# Patient Record
Sex: MALE | NOT HISPANIC OR LATINO | Employment: UNEMPLOYED | URBAN - METROPOLITAN AREA
[De-identification: names, ages, dates, MRNs, and addresses within clinical notes are randomized per-mention and may not be internally consistent; named-entity substitution may affect disease eponyms.]

---

## 2022-11-21 ENCOUNTER — APPOINTMENT (OUTPATIENT)
Dept: GENERAL RADIOLOGY | Facility: CLINIC | Age: 27
DRG: 194 | End: 2022-11-21
Attending: NURSE PRACTITIONER
Payer: COMMERCIAL

## 2022-11-21 ENCOUNTER — HOSPITAL ENCOUNTER (INPATIENT)
Facility: CLINIC | Age: 27
LOS: 1 days | Discharge: LEFT BEFORE DISCHARGE | DRG: 194 | End: 2022-11-22
Attending: EMERGENCY MEDICINE | Admitting: STUDENT IN AN ORGANIZED HEALTH CARE EDUCATION/TRAINING PROGRAM
Payer: COMMERCIAL

## 2022-11-21 DIAGNOSIS — R11.2 NAUSEA AND VOMITING, UNSPECIFIED VOMITING TYPE: ICD-10-CM

## 2022-11-21 DIAGNOSIS — Z11.52 ENCOUNTER FOR SCREENING LABORATORY TESTING FOR SEVERE ACUTE RESPIRATORY SYNDROME CORONAVIRUS 2 (SARS-COV-2): ICD-10-CM

## 2022-11-21 DIAGNOSIS — E83.39 HYPOPHOSPHATEMIA: ICD-10-CM

## 2022-11-21 DIAGNOSIS — E87.6 HYPOKALEMIA: ICD-10-CM

## 2022-11-21 DIAGNOSIS — E87.3 ALKALOSIS: ICD-10-CM

## 2022-11-21 DIAGNOSIS — J10.1 INFLUENZA A: ICD-10-CM

## 2022-11-21 LAB
ALBUMIN SERPL BCG-MCNC: 4.1 G/DL (ref 3.5–5.2)
ALBUMIN UR-MCNC: 20 MG/DL
ALP SERPL-CCNC: 71 U/L (ref 40–129)
ALT SERPL W P-5'-P-CCNC: 21 U/L (ref 10–50)
AMPHETAMINES UR QL SCN: ABNORMAL
ANION GAP SERPL CALCULATED.3IONS-SCNC: 17 MMOL/L (ref 7–15)
APPEARANCE UR: CLEAR
AST SERPL W P-5'-P-CCNC: 19 U/L (ref 10–50)
ATRIAL RATE - MUSE: 105 BPM
BARBITURATES UR QL SCN: ABNORMAL
BASE EXCESS BLDV CALC-SCNC: 19.3 MMOL/L (ref -7.7–1.9)
BASOPHILS # BLD AUTO: 0 10E3/UL (ref 0–0.2)
BASOPHILS NFR BLD AUTO: 0 %
BENZODIAZ UR QL SCN: ABNORMAL
BILIRUB SERPL-MCNC: 1.3 MG/DL
BILIRUB UR QL STRIP: NEGATIVE
BUN SERPL-MCNC: 15.1 MG/DL (ref 6–20)
BZE UR QL SCN: ABNORMAL
CA-I BLD-MCNC: 3.8 MG/DL (ref 4.4–5.2)
CALCIUM SERPL-MCNC: 8.9 MG/DL (ref 8.6–10)
CANNABINOIDS UR QL SCN: ABNORMAL
CHLORIDE SERPL-SCNC: 76 MMOL/L (ref 98–107)
COLOR UR AUTO: YELLOW
CPB POCT: NO
CREAT SERPL-MCNC: 0.98 MG/DL (ref 0.67–1.17)
CRP SERPL-MCNC: <3 MG/L
DEPRECATED HCO3 PLAS-SCNC: 32 MMOL/L (ref 22–29)
DIASTOLIC BLOOD PRESSURE - MUSE: NORMAL MMHG
EOSINOPHIL # BLD AUTO: 0 10E3/UL (ref 0–0.7)
EOSINOPHIL NFR BLD AUTO: 0 %
ERYTHROCYTE [DISTWIDTH] IN BLOOD BY AUTOMATED COUNT: 11 % (ref 10–15)
FLUAV RNA SPEC QL NAA+PROBE: POSITIVE
FLUBV RNA RESP QL NAA+PROBE: NEGATIVE
GFR SERPL CREATININE-BSD FRML MDRD: >90 ML/MIN/1.73M2
GLUCOSE BLD-MCNC: 104 MG/DL (ref 70–99)
GLUCOSE BLDC GLUCOMTR-MCNC: 128 MG/DL (ref 70–99)
GLUCOSE BLDC GLUCOMTR-MCNC: 152 MG/DL (ref 70–99)
GLUCOSE BLDC GLUCOMTR-MCNC: 99 MG/DL (ref 70–99)
GLUCOSE SERPL-MCNC: 93 MG/DL (ref 70–99)
GLUCOSE UR STRIP-MCNC: NEGATIVE MG/DL
HBA1C MFR BLD: 5.2 %
HCO3 BLDV-SCNC: 43 MMOL/L (ref 21–28)
HCO3 BLDV-SCNC: 46 MMOL/L (ref 21–28)
HCT VFR BLD AUTO: 41.1 % (ref 40–53)
HCT VFR BLD CALC: 45 % (ref 40–53)
HGB BLD-MCNC: 14.4 G/DL (ref 13.3–17.7)
HGB BLD-MCNC: 15.3 G/DL (ref 13.3–17.7)
HGB UR QL STRIP: NEGATIVE
HOLD SPECIMEN: NORMAL
IMM GRANULOCYTES # BLD: 0.1 10E3/UL
IMM GRANULOCYTES NFR BLD: 1 %
INTERPRETATION ECG - MUSE: NORMAL
KETONES BLD-SCNC: 0 MMOL/L (ref 0–0.6)
KETONES UR STRIP-MCNC: NEGATIVE MG/DL
LEUKOCYTE ESTERASE UR QL STRIP: NEGATIVE
LIPASE SERPL-CCNC: 49 U/L (ref 13–60)
LYMPHOCYTES # BLD AUTO: 2.4 10E3/UL (ref 0.8–5.3)
LYMPHOCYTES NFR BLD AUTO: 16 %
MAGNESIUM SERPL-MCNC: 2 MG/DL (ref 1.7–2.3)
MAGNESIUM SERPL-MCNC: 2.1 MG/DL (ref 1.7–2.3)
MCH RBC QN AUTO: 26.2 PG (ref 26.5–33)
MCHC RBC AUTO-ENTMCNC: 35 G/DL (ref 31.5–36.5)
MCV RBC AUTO: 75 FL (ref 78–100)
MONOCYTES # BLD AUTO: 1.7 10E3/UL (ref 0–1.3)
MONOCYTES NFR BLD AUTO: 12 %
MUCOUS THREADS #/AREA URNS LPF: PRESENT /LPF
NEUTROPHILS # BLD AUTO: 10.3 10E3/UL (ref 1.6–8.3)
NEUTROPHILS NFR BLD AUTO: 71 %
NITRATE UR QL: NEGATIVE
NRBC # BLD AUTO: 0 10E3/UL
NRBC BLD AUTO-RTO: 0 /100
O2/TOTAL GAS SETTING VFR VENT: 0 %
OPIATES UR QL SCN: ABNORMAL
P AXIS - MUSE: 68 DEGREES
PCO2 BLDV: 36 MM HG (ref 40–50)
PCO2 BLDV: 44 MM HG (ref 40–50)
PH BLDV: 7.6 [PH] (ref 7.32–7.43)
PH BLDV: 7.72 [PH] (ref 7.32–7.43)
PH UR STRIP: 8 [PH] (ref 5–7)
PHOSPHATE SERPL-MCNC: 2 MG/DL (ref 2.5–4.5)
PLATELET # BLD AUTO: 445 10E3/UL (ref 150–450)
PO2 BLDV: 29 MM HG (ref 25–47)
PO2 BLDV: 35 MM HG (ref 25–47)
POTASSIUM BLD-SCNC: 2.8 MMOL/L (ref 3.4–5.3)
POTASSIUM SERPL-SCNC: 2.2 MMOL/L (ref 3.4–5.3)
POTASSIUM SERPL-SCNC: 2.7 MMOL/L (ref 3.4–5.3)
PR INTERVAL - MUSE: 158 MS
PROT SERPL-MCNC: 6.8 G/DL (ref 6.4–8.3)
QRS DURATION - MUSE: 104 MS
QT - MUSE: 412 MS
QTC - MUSE: 544 MS
R AXIS - MUSE: 82 DEGREES
RBC # BLD AUTO: 5.49 10E6/UL (ref 4.4–5.9)
RBC URINE: 0 /HPF
RSV RNA SPEC NAA+PROBE: NEGATIVE
SAO2 % BLDV: 83 % (ref 94–100)
SARS-COV-2 RNA RESP QL NAA+PROBE: NEGATIVE
SODIUM BLD-SCNC: 123 MMOL/L (ref 133–144)
SODIUM SERPL-SCNC: 124 MMOL/L (ref 136–145)
SODIUM SERPL-SCNC: 125 MMOL/L (ref 136–145)
SP GR UR STRIP: 1.02 (ref 1–1.03)
SYSTOLIC BLOOD PRESSURE - MUSE: NORMAL MMHG
T AXIS - MUSE: 56 DEGREES
TROPONIN T SERPL HS-MCNC: 7 NG/L
UROBILINOGEN UR STRIP-MCNC: 12 MG/DL
VENTRICULAR RATE- MUSE: 105 BPM
WBC # BLD AUTO: 14.5 10E3/UL (ref 4–11)
WBC URINE: 1 /HPF

## 2022-11-21 PROCEDURE — 83036 HEMOGLOBIN GLYCOSYLATED A1C: CPT | Performed by: NURSE PRACTITIONER

## 2022-11-21 PROCEDURE — 71045 X-RAY EXAM CHEST 1 VIEW: CPT

## 2022-11-21 PROCEDURE — 87040 BLOOD CULTURE FOR BACTERIA: CPT | Performed by: NURSE PRACTITIONER

## 2022-11-21 PROCEDURE — 250N000013 HC RX MED GY IP 250 OP 250 PS 637: Performed by: PHYSICIAN ASSISTANT

## 2022-11-21 PROCEDURE — 82010 KETONE BODYS QUAN: CPT | Performed by: NURSE PRACTITIONER

## 2022-11-21 PROCEDURE — 250N000011 HC RX IP 250 OP 636: Performed by: NURSE PRACTITIONER

## 2022-11-21 PROCEDURE — 36415 COLL VENOUS BLD VENIPUNCTURE: CPT | Performed by: NURSE PRACTITIONER

## 2022-11-21 PROCEDURE — 99285 EMERGENCY DEPT VISIT HI MDM: CPT | Mod: CS | Performed by: EMERGENCY MEDICINE

## 2022-11-21 PROCEDURE — 258N000003 HC RX IP 258 OP 636: Performed by: NURSE PRACTITIONER

## 2022-11-21 PROCEDURE — 36415 COLL VENOUS BLD VENIPUNCTURE: CPT | Performed by: EMERGENCY MEDICINE

## 2022-11-21 PROCEDURE — 250N000013 HC RX MED GY IP 250 OP 250 PS 637

## 2022-11-21 PROCEDURE — 71045 X-RAY EXAM CHEST 1 VIEW: CPT | Mod: 26 | Performed by: RADIOLOGY

## 2022-11-21 PROCEDURE — 84295 ASSAY OF SERUM SODIUM: CPT | Performed by: PHYSICIAN ASSISTANT

## 2022-11-21 PROCEDURE — 83690 ASSAY OF LIPASE: CPT | Performed by: NURSE PRACTITIONER

## 2022-11-21 PROCEDURE — 258N000003 HC RX IP 258 OP 636: Performed by: PHYSICIAN ASSISTANT

## 2022-11-21 PROCEDURE — 82803 BLOOD GASES ANY COMBINATION: CPT | Performed by: EMERGENCY MEDICINE

## 2022-11-21 PROCEDURE — 80053 COMPREHEN METABOLIC PANEL: CPT | Performed by: NURSE PRACTITIONER

## 2022-11-21 PROCEDURE — 93005 ELECTROCARDIOGRAM TRACING: CPT | Performed by: EMERGENCY MEDICINE

## 2022-11-21 PROCEDURE — 87637 SARSCOV2&INF A&B&RSV AMP PRB: CPT | Performed by: NURSE PRACTITIONER

## 2022-11-21 PROCEDURE — 250N000013 HC RX MED GY IP 250 OP 250 PS 637: Performed by: NURSE PRACTITIONER

## 2022-11-21 PROCEDURE — 83735 ASSAY OF MAGNESIUM: CPT | Performed by: NURSE PRACTITIONER

## 2022-11-21 PROCEDURE — C9113 INJ PANTOPRAZOLE SODIUM, VIA: HCPCS | Performed by: NURSE PRACTITIONER

## 2022-11-21 PROCEDURE — 82330 ASSAY OF CALCIUM: CPT

## 2022-11-21 PROCEDURE — 99207 PR APP CREDIT; MD BILLING SHARED VISIT: CPT | Performed by: PHYSICIAN ASSISTANT

## 2022-11-21 PROCEDURE — 84484 ASSAY OF TROPONIN QUANT: CPT | Performed by: NURSE PRACTITIONER

## 2022-11-21 PROCEDURE — 96374 THER/PROPH/DIAG INJ IV PUSH: CPT | Performed by: EMERGENCY MEDICINE

## 2022-11-21 PROCEDURE — 83735 ASSAY OF MAGNESIUM: CPT | Performed by: PHYSICIAN ASSISTANT

## 2022-11-21 PROCEDURE — 84100 ASSAY OF PHOSPHORUS: CPT | Performed by: NURSE PRACTITIONER

## 2022-11-21 PROCEDURE — 81001 URINALYSIS AUTO W/SCOPE: CPT | Performed by: NURSE PRACTITIONER

## 2022-11-21 PROCEDURE — 250N000009 HC RX 250: Performed by: PHYSICIAN ASSISTANT

## 2022-11-21 PROCEDURE — 85025 COMPLETE CBC W/AUTO DIFF WBC: CPT | Performed by: NURSE PRACTITIONER

## 2022-11-21 PROCEDURE — 36415 COLL VENOUS BLD VENIPUNCTURE: CPT | Performed by: PHYSICIAN ASSISTANT

## 2022-11-21 PROCEDURE — 80307 DRUG TEST PRSMV CHEM ANLYZR: CPT | Performed by: NURSE PRACTITIONER

## 2022-11-21 PROCEDURE — 99223 1ST HOSP IP/OBS HIGH 75: CPT | Mod: AI | Performed by: STUDENT IN AN ORGANIZED HEALTH CARE EDUCATION/TRAINING PROGRAM

## 2022-11-21 PROCEDURE — 250N000011 HC RX IP 250 OP 636: Performed by: PHYSICIAN ASSISTANT

## 2022-11-21 PROCEDURE — C9803 HOPD COVID-19 SPEC COLLECT: HCPCS | Performed by: EMERGENCY MEDICINE

## 2022-11-21 PROCEDURE — 99285 EMERGENCY DEPT VISIT HI MDM: CPT | Mod: CS,25 | Performed by: EMERGENCY MEDICINE

## 2022-11-21 PROCEDURE — 84132 ASSAY OF SERUM POTASSIUM: CPT | Performed by: PHYSICIAN ASSISTANT

## 2022-11-21 PROCEDURE — 82947 ASSAY GLUCOSE BLOOD QUANT: CPT

## 2022-11-21 PROCEDURE — 86140 C-REACTIVE PROTEIN: CPT | Performed by: NURSE PRACTITIONER

## 2022-11-21 PROCEDURE — 93010 ELECTROCARDIOGRAM REPORT: CPT | Performed by: EMERGENCY MEDICINE

## 2022-11-21 PROCEDURE — 120N000002 HC R&B MED SURG/OB UMMC

## 2022-11-21 RX ORDER — OSELTAMIVIR PHOSPHATE 75 MG/1
75 CAPSULE ORAL 2 TIMES DAILY
Status: DISCONTINUED | OUTPATIENT
Start: 2022-11-21 | End: 2022-11-22 | Stop reason: HOSPADM

## 2022-11-21 RX ORDER — DEXTROSE MONOHYDRATE 25 G/50ML
25-50 INJECTION, SOLUTION INTRAVENOUS
Status: DISCONTINUED | OUTPATIENT
Start: 2022-11-21 | End: 2022-11-22 | Stop reason: HOSPADM

## 2022-11-21 RX ORDER — POTASSIUM CHLORIDE 20MEQ/15ML
40 LIQUID (ML) ORAL ONCE
Status: COMPLETED | OUTPATIENT
Start: 2022-11-21 | End: 2022-11-21

## 2022-11-21 RX ORDER — SODIUM CHLORIDE 9 MG/ML
INJECTION, SOLUTION INTRAVENOUS CONTINUOUS
Status: DISCONTINUED | OUTPATIENT
Start: 2022-11-21 | End: 2022-11-21

## 2022-11-21 RX ORDER — LIDOCAINE 40 MG/G
CREAM TOPICAL
Status: DISCONTINUED | OUTPATIENT
Start: 2022-11-21 | End: 2022-11-22 | Stop reason: HOSPADM

## 2022-11-21 RX ORDER — POTASSIUM CHLORIDE 20MEQ/15ML
20 LIQUID (ML) ORAL ONCE
Status: COMPLETED | OUTPATIENT
Start: 2022-11-21 | End: 2022-11-21

## 2022-11-21 RX ORDER — POTASSIUM CHLORIDE 7.45 MG/ML
10 INJECTION INTRAVENOUS ONCE
Status: COMPLETED | OUTPATIENT
Start: 2022-11-21 | End: 2022-11-21

## 2022-11-21 RX ORDER — ONDANSETRON 2 MG/ML
4 INJECTION INTRAMUSCULAR; INTRAVENOUS EVERY 6 HOURS PRN
Status: DISCONTINUED | OUTPATIENT
Start: 2022-11-21 | End: 2022-11-22 | Stop reason: HOSPADM

## 2022-11-21 RX ORDER — ALBUTEROL SULFATE 0.83 MG/ML
2.5 SOLUTION RESPIRATORY (INHALATION)
Status: DISCONTINUED | OUTPATIENT
Start: 2022-11-21 | End: 2022-11-22 | Stop reason: HOSPADM

## 2022-11-21 RX ORDER — LISDEXAMFETAMINE DIMESYLATE 20 MG/1
60 CAPSULE ORAL DAILY
Status: DISCONTINUED | OUTPATIENT
Start: 2022-11-21 | End: 2022-11-21

## 2022-11-21 RX ORDER — SODIUM CHLORIDE 9 MG/ML
INJECTION, SOLUTION INTRAVENOUS CONTINUOUS
Status: DISCONTINUED | OUTPATIENT
Start: 2022-11-21 | End: 2022-11-22 | Stop reason: HOSPADM

## 2022-11-21 RX ORDER — NICOTINE POLACRILEX 4 MG
15-30 LOZENGE BUCCAL
Status: DISCONTINUED | OUTPATIENT
Start: 2022-11-21 | End: 2022-11-22 | Stop reason: HOSPADM

## 2022-11-21 RX ORDER — CALCIUM CARBONATE 500 MG/1
1000 TABLET, CHEWABLE ORAL 4 TIMES DAILY PRN
Status: DISCONTINUED | OUTPATIENT
Start: 2022-11-21 | End: 2022-11-22 | Stop reason: HOSPADM

## 2022-11-21 RX ORDER — ONDANSETRON 4 MG/1
4 TABLET, ORALLY DISINTEGRATING ORAL EVERY 6 HOURS PRN
Status: DISCONTINUED | OUTPATIENT
Start: 2022-11-21 | End: 2022-11-22 | Stop reason: HOSPADM

## 2022-11-21 RX ORDER — POTASSIUM CHLORIDE 1.5 G/1.58G
20 POWDER, FOR SOLUTION ORAL 2 TIMES DAILY
Status: DISCONTINUED | OUTPATIENT
Start: 2022-11-21 | End: 2022-11-22

## 2022-11-21 RX ORDER — GUANFACINE 4 MG/1
4 TABLET, EXTENDED RELEASE ORAL
Status: DISCONTINUED | OUTPATIENT
Start: 2022-11-22 | End: 2022-11-21

## 2022-11-21 RX ADMIN — POTASSIUM CHLORIDE 20 MEQ: 20 SOLUTION ORAL at 17:23

## 2022-11-21 RX ADMIN — POTASSIUM PHOSPHATE, MONOBASIC POTASSIUM PHOSPHATE, DIBASIC 15 MMOL: 224; 236 INJECTION, SOLUTION, CONCENTRATE INTRAVENOUS at 17:43

## 2022-11-21 RX ADMIN — POTASSIUM CHLORIDE 10 MEQ: 7.46 INJECTION, SOLUTION INTRAVENOUS at 16:18

## 2022-11-21 RX ADMIN — OSELTAMIVIR PHOSPHATE 75 MG: 75 CAPSULE ORAL at 19:54

## 2022-11-21 RX ADMIN — SODIUM CHLORIDE: 900 INJECTION, SOLUTION INTRAVENOUS at 16:23

## 2022-11-21 RX ADMIN — SODIUM CHLORIDE 500 ML: 9 INJECTION, SOLUTION INTRAVENOUS at 19:03

## 2022-11-21 RX ADMIN — POTASSIUM CHLORIDE 10 MEQ: 7.46 INJECTION, SOLUTION INTRAVENOUS at 20:07

## 2022-11-21 RX ADMIN — POTASSIUM CHLORIDE 40 MEQ: 20 SOLUTION ORAL at 16:16

## 2022-11-21 RX ADMIN — POTASSIUM CHLORIDE 40 MEQ: 20 SOLUTION ORAL at 22:52

## 2022-11-21 RX ADMIN — PANTOPRAZOLE SODIUM 40 MG: 40 INJECTION, POWDER, FOR SOLUTION INTRAVENOUS at 14:56

## 2022-11-21 ASSESSMENT — ACTIVITIES OF DAILY LIVING (ADL)
ADLS_ACUITY_SCORE: 35

## 2022-11-21 ASSESSMENT — ENCOUNTER SYMPTOMS
DIFFICULTY URINATING: 0
NAUSEA: 1
MYALGIAS: 1
SHORTNESS OF BREATH: 0
SORE THROAT: 0
LIGHT-HEADEDNESS: 0
CHILLS: 0
CONSTIPATION: 1
AGITATION: 0
FLANK PAIN: 0
VOMITING: 1
DIZZINESS: 0
WHEEZING: 0
ABDOMINAL PAIN: 1
FEVER: 0
DYSURIA: 1
COUGH: 1
PALPITATIONS: 0

## 2022-11-21 NOTE — ED TRIAGE NOTES
Pt arrives BIBA, vomiting for past 4 days.  DM1, no medication for 4 days.  BG high w/keytones for EMS (over 500).  VSS on RA.  Endorses chest pain. Last food intake 3am today.

## 2022-11-21 NOTE — H&P
[facility]    History and Physical - Hospitalist Service, GOLD TEAM        Date of Admission:  11/21/2022    Chief Complaint   Nausea/ vomiting    History of Present Illness   Zoran Gonzáles is a 27 year old male admitted on 11/21/2022. He has no significant past medical history and presents to the ER this evening for nausea, vomiting, general malaise and cough that started 3 weeks ago and got worse starting Saturday.  He notes that 3 weeks ago he started to develop intermittent mitten vomiting but was able to keep food down in between episodes every day or 2 and developed a dry cough at that point.  The symptoms became more frequent and a daily occurrence up until Saturday when he was vomiting nonstop and unable to keep food down until this morning.  He called 911 when he felt shaky and lightheaded and EMS arrived with a reported blood sugar found to be 500.  He was brought to the ER for further evaluation.    Upon arrival to the ER he is hemodynamically stable and afebrile with vital signs including temp 98.6 Fahrenheit, heart rate 94 and regular, /69 and he is 100% on room air.  Lab work was revealing for a potassium of 2.2, sodium of 125, total bilirubin of 1.3, ionized calcium of 3.8 and a white blood cell count of 14.5.  A very focused urine drug exam revealed positive for THC.  Chest x-ray revealed a possible right middle lobe pneumonia with no significant fluid or mass-effect.  Respiratory viral viral panel returned positive for influenza A.  COVID testing is pending.  Mr. Gonzáles reports that he was now able to eat pizza earlier today without any subsequent vomiting.  He reports having seizures in the past when his sodium reaches a level of 121.  He is currently very hungry and wants to order dinner.  He denies any fevers, headaches, vision changes, rashes or sores, diarrhea or constipation.  He states that his nausea comes and goes in waves and is much  better this afternoon.  He has not been around any known sick contacts.  He is being admitted to the [facility] service for further care of intermittent vomiting and treatment of influenza A/possible community-acquired pneumonia in the setting of hypokalemia and hyponatremia.    Assessment & Plan   Intermittent nausea/vomiting x3 weeks, THC related cyclical vomiting versus gastroenteritis  He currently was able to eat pizza earlier today and feels hungry, wanting to order food so his nausea is significantly imrproved compared to the nausea/vomiting reported over the past three weeks. Given his big improvement in symptoms and no diarrhea, no imaging needed currently. Continue to hydrate and treat nausea as needed.     -Regular diet - starting with bland foods and working up from there  -Zofran prn   -0.9% NS 125ml/hr    Hypokalemia secondary to vomiting  -Potassium was 2.2 and he was given   -Will recheck potassium and magnesium STAT and replete as needed. If improving nicely, we will have the protocol replace potassium. If it is sluggish to improve, then we will just order appropriate large dosing until it is closer to normal.     Hyponatremia secondary to vomiting/dehydration with a history of seizures with hyponatremia  -Sodium was 123 and he was started on 0.9% NS 125ml/hr this afternoon. Will plan to recheck sodium STAT and adjust treatment as needed. If sodium is not improved, will stop 0.9%NS and start hypertonic saline with seizure precautions.    Influenza A / possible community acquired pneumonia  Leukocytosis  -Start Tamiflu 75mg twice daily x 5 days  -Check procalcitonin. If significantly elevated, could consider starting antibiotics for CAP  -Check blood cultures  -UA checked and negatrive    Hypophosphatremia - replaced, monitor serially       Diet:  Regular  DVT Prophylaxis: Mechanical  Leong Catheter: Not present  Central Lines: None  Cardiac Monitoring: None  Code Status:   FULL CODE    Clinically  Significant Risk Factors Present on Admission        # Hypokalemia: Lowest K = 2.2 mmol/L (Ref range: 3.4-5.3) in last 2 days, will replace as needed  # Hyponatremia: Lowest Na = 123 mmol/L (Ref range: 136-145) in last 2 days, will monitor as appropriate  # Hypocalcemia: Lowest iCa = 3.8 mg/dL (Ref range: 4.4-5.2 mg/dL) in last 2 days, will monitor and replace as appropriate                     Disposition Plan  From home, will discharge back to home once medically stable, likely in 24-48hr     Expected Discharge Date: 11/23/2022                  ELDON Delgado  Hospitalist Service, [facility]  Securely message with the Vocera Web Console (learn more here)  Text page via [facility] Paging/Directory   Please see signed in provider for up to date coverage information  ___________________________________________________________________    Review of Systems    The 10 point Review of Systems is negative other than noted in the HPI or here.     Past Medical History    I have reviewed this patient's medical history and updated it with pertinent information if needed.   Past Medical History:   Diagnosis Date    ADHD (attention deficit hyperactivity disorder)     Asthma     ODD (oppositional defiant disorder)        Past Surgical History   I have reviewed this patient's surgical history and updated it with pertinent information if needed.  No past surgical history on file.    Social History   I have reviewed this patient's social history and updated it with pertinent information if needed.  Social History     Tobacco Use    Smoking status: Every Day     Packs/day: 0.10     Years: 2.00     Pack years: 0.20     Types: Cigarettes    Smokeless tobacco: Never   Substance Use Topics    Alcohol use: Yes     Comment: 2x monthly    Drug use: Yes     Types: Marijuana     Comment: THC-last use 9/5/2012       Family History   I have reviewed this patient's family history and  updated it with pertinent information if needed.  Family History   Problem Relation Age of Onset    Substance Abuse Mother     Bipolar Disorder Mother     Depression Mother     Anxiety Disorder Mother     Substance Abuse Father     Substance Abuse Maternal Grandmother     Substance Abuse Maternal Grandfather     Depression Sister     Anxiety Disorder Sister     Mental Illness Sister     Diabetes Mother     Diabetes Sister     Diabetes Maternal Uncle        Prior to Admission Medications   Prior to Admission Medications   Prescriptions Last Dose Informant Patient Reported? Taking?   FLUoxetine (PROZAC) 10 MG tablet   Yes No   Sig: Take 90 mg by mouth once a week.   GuanFACINE HCl (INTUNIV) 4 MG TB24   Yes No   Sig: Take 4 mg by mouth daily (with breakfast).   Lisdexamfetamine Dimesylate (VYVANSE PO)   Yes No   Sig: Take 60 mg by mouth daily.   albuterol (PROVENTIL HFA: VENTOLIN HFA) 108 (90 BASE) MCG/ACT inhaler   Yes No   Sig: Inhale 2 puffs into the lungs every 6 hours.      Facility-Administered Medications: None     Allergies   No Known Allergies    Physical Exam   Vital Signs: Temp: 98.6  F (37  C) Temp src: Oral BP: 129/69 Pulse: 103   Resp: 12 SpO2: 100 % O2 Device: None (Room air)    Weight: 0 lbs 0 oz    General Appearance: 27 year old gentleman resting in bed, no acute distress, denies pain at present  HEENT: normocephalic, atraumatic, PERRLA  Respiratory: breathing comfortably on room air, 100%, trace crackles present at bilateral bases, no wheezing or rhonchi auscultated bilatearlly  Cardiovascular: regular rate and rhythm, no appreciable murmurs, rubs or gallops  GI: tinkering bowel sounds present, soft, non-tender to palpation throughout, no rebound or guarding  Skin: warm, dry, no open sores, lesions or ulcerations  Musculoskeletal: maintains equal strength in bilateral upper and lower extremities  Neurologic: no focal neurologic deficits  Psychiatric: alert, oriented to name, date, hospital and recent  events    Data   Data reviewed today: I reviewed all medications, new labs and imaging results over the last 24 hours.   Recent Labs   Lab 11/21/22  1436 11/21/22  1429 11/21/22  1427   WBC  --   --  14.5*   HGB  --  15.3 14.4   MCV  --   --  75*   PLT  --   --  445   NA  --  123* 125*   POTASSIUM  --  2.8* 2.2*   CHLORIDE  --   --  76*   CO2  --   --  32*   BUN  --   --  15.1   CR  --   --  0.98   ANIONGAP  --   --  17*   KEO  --   --  8.9   * 104* 93   ALBUMIN  --   --  4.1   PROTTOTAL  --   --  6.8   BILITOTAL  --   --  1.3*   ALKPHOS  --   --  71   ALT  --   --  21   AST  --   --  19   LIPASE  --   --  49

## 2022-11-21 NOTE — ED PROVIDER NOTES
[place] EMERGENCY DEPARTMENT ([facility])  11/21/22   ED 27  ED Provider Note  [facility]      History     Chief Complaint   Patient presents with     Hyperglycemia     HPI  Zoran Gonzáles is a 27 year old male with a reported history of type 1 diabetes, seizure disorder who presents via EMS for nausea, vomiting, generalized malaise.  Over the last 4 days developed nausea and vomiting and generalized body aches.  911 was called, paramedics noted his blood sugar was high (greater than 500) and he was transported here for further evaluation.  He last ate at 3 AM today.  He endorses chest pain.    Report over the last 4 days he has had intermittent abdominal pain, as well as chest pain, he describes as feeling a sizzling sensation.  He reports he has been able to eat and drink some, and his abdominal pain and nausea are unrelated to eating or drinking.  He reports some constipation with loose stools when he is able to have a bowel movement.  Also reports mild burning when he urinates.  Denies any sick contacts.  Denies fevers, chills.  Reports he is unsure what type of diabetic years, but believes he has been on insulin in the past. Reports 3-4 episodes of vomiting per day.    Has been hospitalized twice this year for similar complaints most recently 08/21/22. Was hospitalized for acute kidney injury, metabolic alkalosis, hyponatremia. He did have a seizure when his sodium level was 121 and prior to being clear for discharged decided to leave AMA.     Nursing staff did speak to patient's mother, who reported he was caught by police about 2 weeks ago w/ fentanyl, at which point he consumed the 3 tabs he had on his persons.      Past Medical History  Past Medical History:   Diagnosis Date     ADHD (attention deficit hyperactivity disorder)      Asthma      ODD (oppositional defiant disorder)      No past surgical history on file.  albuterol (PROVENTIL HFA: VENTOLIN HFA) 108 (90  BASE) MCG/ACT inhaler  guanFACINE HCl (INTUNIV) 4 MG TB24  Lisdexamfetamine Dimesylate (VYVANSE PO)      No Known Allergies  Family History  Family History   Problem Relation Age of Onset     Substance Abuse Mother      Bipolar Disorder Mother      Depression Mother      Anxiety Disorder Mother      Substance Abuse Father      Substance Abuse Maternal Grandmother      Substance Abuse Maternal Grandfather      Depression Sister      Anxiety Disorder Sister      Mental Illness Sister      Diabetes Mother      Diabetes Sister      Diabetes Maternal Uncle      Social History   Social History     Tobacco Use     Smoking status: Every Day     Packs/day: 0.10     Years: 2.00     Pack years: 0.20     Types: Cigarettes     Smokeless tobacco: Never   Substance Use Topics     Alcohol use: Yes     Comment: 2x monthly     Drug use: Yes     Types: Marijuana     Comment: THC-last use 9/5/2012      Past medical history, past surgical history, medications, allergies, family history, and social history were reviewed with the patient. No additional pertinent items.       Review of Systems   Constitutional: Negative for chills and fever.   HENT: Negative for sore throat.    Respiratory: Positive for cough. Negative for shortness of breath and wheezing.    Cardiovascular: Positive for chest pain. Negative for palpitations and leg swelling.   Gastrointestinal: Positive for abdominal pain, constipation, nausea and vomiting.   Genitourinary: Positive for dysuria. Negative for difficulty urinating and flank pain.   Musculoskeletal: Positive for myalgias.   Neurological: Negative for dizziness and light-headedness.   Psychiatric/Behavioral: Negative for agitation.   All other systems reviewed and are negative.    A complete review of systems was performed with pertinent positives and negatives noted in the HPI, and all other systems negative.    Physical Exam   BP: 119/83  Pulse: 96  Temp: 98.6  F (37  C)  Resp: 14  SpO2: 100 %  Physical  Exam  Vitals and nursing note reviewed.   HENT:      Head: Normocephalic.      Right Ear: External ear normal.      Left Ear: External ear normal.      Nose: Nose normal.      Mouth/Throat:      Mouth: Mucous membranes are moist.   Eyes:      Conjunctiva/sclera: Conjunctivae normal.   Cardiovascular:      Rate and Rhythm: Normal rate and regular rhythm.      Pulses: Normal pulses.      Heart sounds: Normal heart sounds.   Pulmonary:      Effort: Pulmonary effort is normal.      Breath sounds: Normal breath sounds.   Abdominal:      General: Abdomen is flat. Bowel sounds are normal. There is no distension.      Palpations: Abdomen is soft.      Tenderness: There is no abdominal tenderness. There is no guarding or rebound.   Musculoskeletal:         General: Normal range of motion.      Cervical back: Normal range of motion.   Skin:     General: Skin is warm.   Neurological:      Mental Status: He is alert and oriented to person, place, and time.   Psychiatric:         Mood and Affect: Mood normal.         ED Course     ED Course as of 11/21/22 2009 Mon Nov 21, 2022   1621 Received critical lab value, potassium on CMP is 2.2      Procedures            EKG Interpretation:      Interpreted by MATEUS Chávez CNP and reviewed by Dr. Wilcox.  Time reviewed: 1435  Symptoms at time of EKG: None   Rhythm: normal sinus   Rate: Normal  Axis: Normal  Ectopy: none  Conduction: normal  ST Segments/ T Waves: QTc prolongation 544  Q Waves: none  Comparison to prior: No old EKG available  Clinical Impression: prolonged QT interval          Results for orders placed or performed during the hospital encounter of 11/21/22   XR Chest Port 1 View     Status: None    Narrative    EXAM: XR CHEST PORT 1 VIEW 11/21/2022 3:25 PM    HISTORY: 27-year-old male with cough and leukocytosis.    COMPARISON: Outside hospital chest radiograph 3/31/2022, 9/2/2021 (no  images available, only interpretation).    TECHNIQUE: Portable AP view of the  chest.    FINDINGS:   Midline trachea. Normal cardiomediastinal silhouette. Distinct  pulmonary vasculature. No significant pleural effusion. No discernible  pneumothorax. Normal lung volumes. Subtle right middle lobe airspace  consolidation. Unremarkable upper abdomen. No acute or suspicious  osseous abnormalities. Unremarkable soft tissues.      Impression    IMPRESSION: Possible right middle lobe pneumonia. Upright PA and  lateral chest radiographs of the helpful for further detail..    I have personally reviewed the examination and initial interpretation  and I agree with the findings.    SAMRA FREIRE MD         SYSTEM ID:  [id number]   Comprehensive metabolic panel     Status: Abnormal   Result Value Ref Range    Sodium 125 (L) 136 - 145 mmol/L    Potassium 2.2 (LL) 3.4 - 5.3 mmol/L    Chloride 76 (L) 98 - 107 mmol/L    Carbon Dioxide (CO2) 32 (H) 22 - 29 mmol/L    Anion Gap 17 (H) 7 - 15 mmol/L    Urea Nitrogen 15.1 6.0 - 20.0 mg/dL    Creatinine 0.98 0.67 - 1.17 mg/dL    Calcium 8.9 8.6 - 10.0 mg/dL    Glucose 93 70 - 99 mg/dL    Alkaline Phosphatase 71 40 - 129 U/L    AST 19 10 - 50 U/L    ALT 21 10 - 50 U/L    Protein Total 6.8 6.4 - 8.3 g/dL    Albumin 4.1 3.5 - 5.2 g/dL    Bilirubin Total 1.3 (H) <=1.2 mg/dL    GFR Estimate >90 >60 mL/min/1.73m2   Troponin T, High Sensitivity     Status: Normal   Result Value Ref Range    Troponin T, High Sensitivity 7 <=22 ng/L   Magnesium     Status: Normal   Result Value Ref Range    Magnesium 2.1 1.7 - 2.3 mg/dL   Phosphorus     Status: Abnormal   Result Value Ref Range    Phosphorus 2.0 (L) 2.5 - 4.5 mg/dL   UA with Microscopic reflex to Culture     Status: Abnormal    Specimen: Urine, Clean Catch   Result Value Ref Range    Color Urine Yellow Colorless, Straw, Light Yellow, Yellow    Appearance Urine Clear Clear    Glucose Urine Negative Negative mg/dL    Bilirubin Urine Negative Negative    Ketones Urine Negative Negative mg/dL    Specific Gravity Urine 1.025  1.003 - 1.035    Blood Urine Negative Negative    pH Urine 8.0 (H) 5.0 - 7.0    Protein Albumin Urine 20 (A) Negative mg/dL    Urobilinogen Urine 12.0 (A) Normal, 2.0 mg/dL    Nitrite Urine Negative Negative    Leukocyte Esterase Urine Negative Negative    Mucus Urine Present (A) None Seen /LPF    RBC Urine 0 <=2 /HPF    WBC Urine 1 <=5 /HPF    Narrative    Urine Culture not indicated   Ketone Beta-Hydroxybutyrate Quantitative     Status: Normal   Result Value Ref Range    Ketone (Beta-Hydroxybutyrate) Quantitative 0.0 0.0 - 0.6 mmol/L   [place] Draw     Status: None    Narrative    The following orders were created for panel order [place] Draw.  Procedure                               Abnormality         Status                     ---------                               -----------         ------                     Extra Blue Top Tube[[id number]]                              Final result               Extra Red Top Tube[[id number]]                               Final result               Extra Green Top (Lithium...[[id number]]                      Final result               Extra Purple Top Tube[[id number]]                            Final result                 Please view results for these tests on the individual orders.   Hemoglobin A1c     Status: Normal   Result Value Ref Range    Hemoglobin A1C 5.2 <5.7 %   CBC with platelets and differential     Status: Abnormal   Result Value Ref Range    WBC Count 14.5 (H) 4.0 - 11.0 10e3/uL    RBC Count 5.49 4.40 - 5.90 10e6/uL    Hemoglobin 14.4 13.3 - 17.7 g/dL    Hematocrit 41.1 40.0 - 53.0 %    MCV 75 (L) 78 - 100 fL    MCH 26.2 (L) 26.5 - 33.0 pg    MCHC 35.0 31.5 - 36.5 g/dL    RDW 11.0 10.0 - 15.0 %    Platelet Count 445 150 - 450 10e3/uL    % Neutrophils 71 %    % Lymphocytes 16 %    % Monocytes 12 %    % Eosinophils 0 %    % Basophils 0 %    % Immature Granulocytes 1 %    NRBCs per 100 WBC 0 <1 /100    Absolute Neutrophils 10.3 (H) 1.6 - 8.3 10e3/uL    Absolute  Lymphocytes 2.4 0.8 - 5.3 10e3/uL    Absolute Monocytes 1.7 (H) 0.0 - 1.3 10e3/uL    Absolute Eosinophils 0.0 0.0 - 0.7 10e3/uL    Absolute Basophils 0.0 0.0 - 0.2 10e3/uL    Absolute Immature Granulocytes 0.1 <=0.4 10e3/uL    Absolute NRBCs 0.0 10e3/uL   Extra Blue Top Tube     Status: None   Result Value Ref Range    Hold Specimen JIC    Extra Red Top Tube     Status: None   Result Value Ref Range    Hold Specimen JIC    Extra Green Top (Lithium Heparin) Tube     Status: None   Result Value Ref Range    Hold Specimen JIC    Extra Purple Top Tube     Status: None   Result Value Ref Range    Hold Specimen JIC    iStat Gases Electrolytes ICA Glucose Venous, POCT     Status: Abnormal   Result Value Ref Range    CPB Applied No     Hematocrit POCT 45 40 - 53 %    Calcium, Ionized Whole Blood POCT 3.8 (L) 4.4 - 5.2 mg/dL    Glucose Whole Blood POCT 104 (H) 70 - 99 mg/dL    Bicarbonate Venous POCT 46 (HH) 21 - 28 mmol/L    Hemoglobin POCT 15.3 13.3 - 17.7 g/dL    Potassium POCT 2.8 (L) 3.4 - 5.3 mmol/L    Sodium POCT 123 (L) 133 - 144 mmol/L    pCO2 Venous POCT 36 (L) 40 - 50 mm Hg    pO2 Venous POCT 35 25 - 47 mm Hg    pH Venous POCT 7.72 (HH) 7.32 - 7.43    O2 Sat, Venous POCT 83 (L) 94 - 100 %   Blood gas venous     Status: Abnormal   Result Value Ref Range    pH Venous 7.60 (H) 7.32 - 7.43    pCO2 Venous 44 40 - 50 mm Hg    pO2 Venous 29 25 - 47 mm Hg    Bicarbonate Venous 43 (H) 21 - 28 mmol/L    Base Excess/Deficit (+/-) 19.3 (H) -7.7 - 1.9 mmol/L    FIO2 0    Glucose by meter     Status: Abnormal   Result Value Ref Range    GLUCOSE BY METER POCT 128 (H) 70 - 99 mg/dL   Symptomatic; Unknown Influenza A/B & SARS-CoV2 (COVID-19) Virus PCR Multiplex Nasopharyngeal     Status: Abnormal    Specimen: Nasopharyngeal; Swab   Result Value Ref Range    Influenza A PCR Positive (A) Negative    Influenza B PCR Negative Negative    RSV PCR Negative Negative    SARS CoV2 PCR Negative Negative    Narrative    Testing was performed  using the Xpert Xpress CoV2/Flu/RSV Assay on the [employer] GeneXpert Instrument. This test should be ordered for the detection of SARS-CoV-2 and influenza viruses in individuals who meet clinical and/or epidemiological criteria. Test performance is unknown in asymptomatic patients. This test is for in vitro diagnostic use under the FDA EUA for laboratories certified under CLIA to perform high or moderate complexity testing. This test has not been FDA cleared or approved. A negative result does not rule out the presence of PCR inhibitors in the specimen or target RNA in concentration below the limit of detection for the assay. If only one viral target is positive but coinfection with multiple targets is suspected, the sample should be re-tested with another FDA cleared, approved, or authorized test, if coinfection would change clinical management. This test was validated by the [facility]. These laboratories are certified under the Clinical Laboratory Improvement Amendments of 1988 (CLIA-88) as qualified to perform high complexity laboratory testing.   Lipase     Status: Normal   Result Value Ref Range    Lipase 49 13 - 60 U/L   CRP inflammation     Status: Normal   Result Value Ref Range    CRP Inflammation <3.00 <5.00 mg/L   Drug abuse screen 1 urine (ED)     Status: Abnormal   Result Value Ref Range    Amphetamines Urine Screen Negative Screen Negative    Barbituates Urine Screen Negative Screen Negative    Benzodiazepine Urine Screen Negative Screen Negative    Cannabinoids Urine Screen Positive (A) Screen Negative    Cocaine Urine Screen Negative Screen Negative    Opiates Urine Screen Negative Screen Negative   Extra Tube     Status: None (In process)    Narrative    The following orders were created for panel order Extra Tube.  Procedure                               Abnormality         Status                     ---------                               -----------         ------                      Extra Purple Top Tube[[id number]]                            In process                   Please view results for these tests on the individual orders.   Glucose by meter     Status: Abnormal   Result Value Ref Range    GLUCOSE BY METER POCT 152 (H) 70 - 99 mg/dL   EKG 12-lead, tracing only     Status: None   Result Value Ref Range    Systolic Blood Pressure  mmHg    Diastolic Blood Pressure  mmHg    Ventricular Rate 105 BPM    Atrial Rate 105 BPM    MT Interval 158 ms    QRS Duration 104 ms     ms    QTc 544 ms    P Axis 68 degrees    R AXIS 82 degrees    T Axis 56 degrees    Interpretation ECG       Sinus tachycardia  Minimal voltage criteria for LVH, may be normal variant  Prolonged QT  Abnormal ECG  Unconfirmed report - interpretation of this ECG is computer generated - see medical record for final interpretation  Confirmed by - EMERGENCY ROOM, PHYSICIAN ([id number]),  KIRTI LOAIZA ([id number]) on 11/21/2022 2:52:54 PM     CBC with platelets differential     Status: Abnormal    Narrative    The following orders were created for panel order CBC with platelets differential.  Procedure                               Abnormality         Status                     ---------                               -----------         ------                     CBC with platelets and d...[[id number]]  Abnormal            Final result                 Please view results for these tests on the individual orders.   Urine Drugs of Abuse Screen     Status: Abnormal    Narrative    The following orders were created for panel order Urine Drugs of Abuse Screen.  Procedure                               Abnormality         Status                     ---------                               -----------         ------                     Drug abuse screen 1 urin...[[id number]]  Abnormal            Final result                 Please view results for these tests on the individual orders.     Medications   0.9% sodium chloride BOLUS  (1,000 mLs Intravenous Not Given 11/21/22 1425)   sodium chloride 0.9% infusion ( Intravenous Rate/Dose Verify 11/21/22 2008)   potassium phosphate 15 mmol in D5W 250 mL intermittent infusion (15 mmol Intravenous Given 11/21/22 1743)   potassium chloride (KLOR-CON) Packet 20 mEq (20 mEq Oral Not Given 11/21/22 1900)   potassium chloride 10 mEq in 100 mL sterile water infusion (10 mEq Intravenous New Bag 11/21/22 2007)   lidocaine 1 % 0.1-1 mL (has no administration in time range)   lidocaine (LMX4) cream (has no administration in time range)   sodium chloride (PF) 0.9% PF flush 3 mL (3 mLs Intracatheter Given 11/21/22 1746)   sodium chloride (PF) 0.9% PF flush 3 mL (has no administration in time range)   melatonin tablet 1 mg (has no administration in time range)   ondansetron (ZOFRAN ODT) ODT tab 4 mg (has no administration in time range)     Or   ondansetron (ZOFRAN) injection 4 mg (has no administration in time range)   calcium carbonate (TUMS) chewable tablet 1,000 mg (has no administration in time range)   glucose gel 15-30 g (has no administration in time range)     Or   dextrose 50 % injection 25-50 mL (has no administration in time range)     Or   glucagon injection 1 mg (has no administration in time range)   insulin aspart (NovoLOG) injection (RAPID ACTING) (1 Units Subcutaneous Not Given 11/21/22 1956)   insulin aspart (NovoLOG) injection (RAPID ACTING) (has no administration in time range)   albuterol (PROVENTIL) neb solution 2.5 mg (has no administration in time range)   oseltamivir (TAMIFLU) capsule 75 mg (75 mg Oral Given 11/21/22 1954)   pantoprazole (PROTONIX) IV push injection 40 mg (40 mg Intravenous Given 11/21/22 1456)   potassium chloride (KAYCIEL) solution 40 mEq (40 mEq Oral Given 11/21/22 1616)   potassium chloride 10 mEq in 100 mL sterile water infusion (0 mEq Intravenous Stopped 11/21/22 1834)   potassium chloride (KAYCIEL) solution 20 mEq (20 mEq Oral Given 11/21/22 1723)   0.9% sodium  chloride BOLUS (500 mLs Intravenous New Bag 11/21/22 1907)        Assessments & Plan (with Medical Decision Making)   27 year old male w/ PMH notable for hypokalemia, alkalosis, hypo sodium induced seizure.    Clinically, patient appears in no acute distress, but does have increased QRS complex on ECG, with prolonged QTc. Vital signs stable without hypotension. Otherwise on examination patient without tenderness on abdominal exam.    Ddx includes, but not limited to DKA, GI loss secondary to nausea, vomiting and decreased PO intake.    With EMS report stating that patient's blood sugar was too high for glucometer his finger stick glucose was 128, and istat 104 in the ED. An IV was placed and labs drawn with a CBC, CMP, troponin, magnesium, phosphorus, VBG, hemoglobin A1c, lipase, CRP, serum ketones ordered.  In addition to a UA, UDS, influenza and COVID swab.  Portable chest x-ray as well.    His bedside i-STAT showed a ionized calcium of 3.8, a bicarb of 46, potassium of 2.8, and a sodium of 123 pH of 7.72.   His A1c was 5.2, his serum ketones were negative, his lab VBG showed a pH of 7.6, and bicarb of 43.  CBC was positive for leukocytosis, stable hemoglobin.  His COVID NP was notable for potassium 2.2, sodium 125, a small anion gap of 17.  Troponin was negative, magnesium was normal.  His phosphorus was low at 2.  Lipase was negative.  CRP was less than 3.  He was negative for COVID but positive for influenza A.  His urine was negative for signs of infection, UDS positive for THC.  Chest x-ray report was read as possible right middle lobe pneumonia.  Myself and attending reviewed the x-rays and at this time do not believe patient has pneumonia.  Will defer on antibiotics. His ECG did show a prolonged QTc, and he was not given an prolonging medications for nausea.    With his hyponatremia, initially an infusion of 125 an hour of normal saline was ordered as patient had received a 500mL NS bolus from EMS.  Initially 40 mEq of p.o. potassium, and 10 mEq IV potassium was ordered.  With his hypophosphatemia 15 mmol of potassium phosphate was ordered as well.  Due to vomiting he was given 40 mg of Protonix. With his official potassium of 2.2, an additional 20 mEq of p.o. potassium, in addition to another 10 mEq of IV potassium was ordered.  Patient was able to tolerate p.o. intake and had dinner.    At this time it appears patient's influenza caused him to experience severe nausea and vomiting which appear to cause the patient to go into metabolic alkalosis with his electrolyte abnormalities.  With his presentation patient will be admitted to internal medicine. I reviewed patient andpresentation, current state of workup/any pending studies with internal medicine. They will admit for further evaluation/management, F/U pending studies as needed, coordinate w/ consulting services as needed.     I have reviewed the available findings, plan for admission with patient.    --    ED Attending Physician Attestation    I Urbano Wilcox MD, cared for this patient with the Advanced Practice Provider (SHILA). I have performed a history and physical examination of the patient independent of the SHILA. I reviewed the SHILA's documentation above and agree with the documented findings and plan of care. I personally provided a substantive portion of the care for this patient.    I personally performed the substantive portion of the medical decision making for this visit - please see the SHILA's documentation for full details.      Summary of HPI, PE, ED Course   Patient is a 27 year old male evaluated in the emergency department for 4 days of vomiting.  Review of old records in care everywhere demonstrates patient had a similar presentation and had to be admitted to the ICU at an outside hospital for significant electrolyte abnormalities and a seizure in the setting of mild hyponatremia.  Further history obtained from the patient's  mother is that the patient may be experiencing some withdrawal from fentanyl, but he does not appear to be acutely withdrawing on exam Sandifer symptoms other than nausea and vomiting. Exam notable for patient is awake and alert, abdominal exam is benign, he is mentating normally protecting his airway without difficulty. ED course notable for patient noted to have significant hypokalemia as well as a metabolic and respiratory alkalosis on venous blood gas testing.  He is hypophosphatemic and his twelve-lead EKG demonstrates prolongation of the QT interval.  He was also noted to be mildly hyponatremic.  Potassium repletion was initiated as well as phosphorus repletion and he was kept on the cardiac monitor. After the completion of care in the emergency department, the patient was admitted to inpatient.           Urbano Wilcox MD  Emergency Medicine       I have reviewed the nursing notes. I have reviewed the findings, diagnosis, plan and need for follow up with the patient.    New Prescriptions    No medications on file       Final diagnoses:   Hypokalemia   Alkalosis   Hypophosphatemia   Influenza A   Nausea and vomiting, unspecified vomiting type       --  MATEUS Chávez [facility] EMERGENCY DEPARTMENT  11/21/2022     Sergio Albright APRN CNP  11/21/22 2010       Urbano Wilcox MD  11/22/22 1111

## 2022-11-21 NOTE — ED NOTES
Pt gave ok to relay updates on care to mom, Pablo. Per pt's mother, pt was caught by police ~2 week ago w/ fentanyl, at which point he consumed the 3 tabs he had on his persons. Per pt's mother, pt frequently tries to detox from fentanyl on his own, at which point he has withdrawals. Of note, pt endorses to that incident being the last time he used; denies withdrawal symptoms.

## 2022-11-22 VITALS
WEIGHT: 180.34 LBS | TEMPERATURE: 97.8 F | RESPIRATION RATE: 20 BRPM | SYSTOLIC BLOOD PRESSURE: 136 MMHG | DIASTOLIC BLOOD PRESSURE: 79 MMHG | HEART RATE: 71 BPM | OXYGEN SATURATION: 100 % | HEIGHT: 70 IN | BODY MASS INDEX: 25.82 KG/M2

## 2022-11-22 LAB
ALBUMIN SERPL BCG-MCNC: 3.4 G/DL (ref 3.5–5.2)
ALP SERPL-CCNC: 55 U/L (ref 40–129)
ALT SERPL W P-5'-P-CCNC: 17 U/L (ref 10–50)
ANION GAP SERPL CALCULATED.3IONS-SCNC: 25 MMOL/L (ref 7–15)
AST SERPL W P-5'-P-CCNC: 25 U/L (ref 10–50)
BASOPHILS # BLD AUTO: 0 10E3/UL (ref 0–0.2)
BASOPHILS NFR BLD AUTO: 0 %
BILIRUB SERPL-MCNC: 1.1 MG/DL
BUN SERPL-MCNC: 13.6 MG/DL (ref 6–20)
C PNEUM DNA SPEC QL NAA+PROBE: NOT DETECTED
CALCIUM SERPL-MCNC: 8.3 MG/DL (ref 8.6–10)
CHLORIDE SERPL-SCNC: 91 MMOL/L (ref 98–107)
CREAT SERPL-MCNC: 0.93 MG/DL (ref 0.67–1.17)
DEPRECATED HCO3 PLAS-SCNC: 18 MMOL/L (ref 22–29)
EOSINOPHIL # BLD AUTO: 0 10E3/UL (ref 0–0.7)
EOSINOPHIL NFR BLD AUTO: 0 %
ERYTHROCYTE [DISTWIDTH] IN BLOOD BY AUTOMATED COUNT: 11.2 % (ref 10–15)
FLUAV H1 2009 PAND RNA SPEC QL NAA+PROBE: NOT DETECTED
FLUAV H1 RNA SPEC QL NAA+PROBE: NOT DETECTED
FLUAV H3 RNA SPEC QL NAA+PROBE: NOT DETECTED
FLUAV RNA SPEC QL NAA+PROBE: NOT DETECTED
FLUBV RNA SPEC QL NAA+PROBE: NOT DETECTED
GFR SERPL CREATININE-BSD FRML MDRD: >90 ML/MIN/1.73M2
GLUCOSE BLDC GLUCOMTR-MCNC: 97 MG/DL (ref 70–99)
GLUCOSE SERPL-MCNC: 90 MG/DL (ref 70–99)
HADV DNA SPEC QL NAA+PROBE: NOT DETECTED
HCOV PNL SPEC NAA+PROBE: NOT DETECTED
HCT VFR BLD AUTO: 34.9 % (ref 40–53)
HGB BLD-MCNC: 11.8 G/DL (ref 13.3–17.7)
HMPV RNA SPEC QL NAA+PROBE: NOT DETECTED
HPIV1 RNA SPEC QL NAA+PROBE: NOT DETECTED
HPIV2 RNA SPEC QL NAA+PROBE: NOT DETECTED
HPIV3 RNA SPEC QL NAA+PROBE: NOT DETECTED
HPIV4 RNA SPEC QL NAA+PROBE: NOT DETECTED
IMM GRANULOCYTES # BLD: 0.1 10E3/UL
IMM GRANULOCYTES NFR BLD: 1 %
LYMPHOCYTES # BLD AUTO: 2.2 10E3/UL (ref 0.8–5.3)
LYMPHOCYTES NFR BLD AUTO: 20 %
M PNEUMO DNA SPEC QL NAA+PROBE: NOT DETECTED
MAGNESIUM SERPL-MCNC: 2 MG/DL (ref 1.7–2.3)
MCH RBC QN AUTO: 25.9 PG (ref 26.5–33)
MCHC RBC AUTO-ENTMCNC: 33.8 G/DL (ref 31.5–36.5)
MCV RBC AUTO: 77 FL (ref 78–100)
MONOCYTES # BLD AUTO: 1.1 10E3/UL (ref 0–1.3)
MONOCYTES NFR BLD AUTO: 10 %
MRSA DNA SPEC QL NAA+PROBE: NEGATIVE
NEUTROPHILS # BLD AUTO: 7.5 10E3/UL (ref 1.6–8.3)
NEUTROPHILS NFR BLD AUTO: 69 %
NRBC # BLD AUTO: 0 10E3/UL
NRBC BLD AUTO-RTO: 0 /100
PHOSPHATE SERPL-MCNC: 1.8 MG/DL (ref 2.5–4.5)
PLATELET # BLD AUTO: 298 10E3/UL (ref 150–450)
POTASSIUM SERPL-SCNC: 2.7 MMOL/L (ref 3.4–5.3)
POTASSIUM SERPL-SCNC: 2.7 MMOL/L (ref 3.4–5.3)
PROCALCITONIN SERPL IA-MCNC: 0.04 NG/ML
PROT SERPL-MCNC: 5.4 G/DL (ref 6.4–8.3)
RBC # BLD AUTO: 4.56 10E6/UL (ref 4.4–5.9)
RSV RNA SPEC QL NAA+PROBE: NOT DETECTED
RSV RNA SPEC QL NAA+PROBE: NOT DETECTED
RV+EV RNA SPEC QL NAA+PROBE: NOT DETECTED
SA TARGET DNA: NEGATIVE
SARS-COV-2 RNA RESP QL NAA+PROBE: NEGATIVE
SODIUM SERPL-SCNC: 134 MMOL/L (ref 136–145)
WBC # BLD AUTO: 11 10E3/UL (ref 4–11)

## 2022-11-22 PROCEDURE — 99207 PR APP CREDIT; MD BILLING SHARED VISIT: CPT

## 2022-11-22 PROCEDURE — 84132 ASSAY OF SERUM POTASSIUM: CPT | Performed by: STUDENT IN AN ORGANIZED HEALTH CARE EDUCATION/TRAINING PROGRAM

## 2022-11-22 PROCEDURE — 99207 PR NO BILLABLE SERVICE THIS VISIT: CPT | Performed by: STUDENT IN AN ORGANIZED HEALTH CARE EDUCATION/TRAINING PROGRAM

## 2022-11-22 PROCEDURE — 83735 ASSAY OF MAGNESIUM: CPT | Performed by: STUDENT IN AN ORGANIZED HEALTH CARE EDUCATION/TRAINING PROGRAM

## 2022-11-22 PROCEDURE — 80053 COMPREHEN METABOLIC PANEL: CPT | Performed by: PHYSICIAN ASSISTANT

## 2022-11-22 PROCEDURE — 36415 COLL VENOUS BLD VENIPUNCTURE: CPT | Performed by: STUDENT IN AN ORGANIZED HEALTH CARE EDUCATION/TRAINING PROGRAM

## 2022-11-22 PROCEDURE — 87486 CHLMYD PNEUM DNA AMP PROBE: CPT | Performed by: PHYSICIAN ASSISTANT

## 2022-11-22 PROCEDURE — 85025 COMPLETE CBC W/AUTO DIFF WBC: CPT | Performed by: PHYSICIAN ASSISTANT

## 2022-11-22 PROCEDURE — 258N000003 HC RX IP 258 OP 636: Performed by: PHYSICIAN ASSISTANT

## 2022-11-22 PROCEDURE — 87641 MR-STAPH DNA AMP PROBE: CPT | Performed by: STUDENT IN AN ORGANIZED HEALTH CARE EDUCATION/TRAINING PROGRAM

## 2022-11-22 PROCEDURE — 250N000013 HC RX MED GY IP 250 OP 250 PS 637: Performed by: STUDENT IN AN ORGANIZED HEALTH CARE EDUCATION/TRAINING PROGRAM

## 2022-11-22 PROCEDURE — 84145 PROCALCITONIN (PCT): CPT | Performed by: STUDENT IN AN ORGANIZED HEALTH CARE EDUCATION/TRAINING PROGRAM

## 2022-11-22 PROCEDURE — U0005 INFEC AGEN DETEC AMPLI PROBE: HCPCS | Performed by: PHYSICIAN ASSISTANT

## 2022-11-22 PROCEDURE — 84100 ASSAY OF PHOSPHORUS: CPT

## 2022-11-22 RX ORDER — POTASSIUM CHLORIDE 750 MG/1
40 TABLET, EXTENDED RELEASE ORAL ONCE
Status: COMPLETED | OUTPATIENT
Start: 2022-11-22 | End: 2022-11-22

## 2022-11-22 RX ORDER — POTASSIUM CHLORIDE 1.5 G/1.58G
20 POWDER, FOR SOLUTION ORAL 2 TIMES DAILY
Status: DISCONTINUED | OUTPATIENT
Start: 2022-11-22 | End: 2022-11-22 | Stop reason: HOSPADM

## 2022-11-22 RX ORDER — POTASSIUM CHLORIDE 750 MG/1
20 TABLET, EXTENDED RELEASE ORAL ONCE
Status: DISCONTINUED | OUTPATIENT
Start: 2022-11-22 | End: 2022-11-22 | Stop reason: HOSPADM

## 2022-11-22 RX ADMIN — OSELTAMIVIR PHOSPHATE 75 MG: 75 CAPSULE ORAL at 08:04

## 2022-11-22 RX ADMIN — POTASSIUM CHLORIDE 20 MEQ: 1.5 POWDER, FOR SOLUTION ORAL at 08:04

## 2022-11-22 RX ADMIN — SODIUM CHLORIDE: 900 INJECTION, SOLUTION INTRAVENOUS at 02:46

## 2022-11-22 RX ADMIN — POTASSIUM CHLORIDE 40 MEQ: 750 TABLET, EXTENDED RELEASE ORAL at 08:04

## 2022-11-22 RX ADMIN — SODIUM CHLORIDE: 900 INJECTION, SOLUTION INTRAVENOUS at 08:59

## 2022-11-22 RX ADMIN — POTASSIUM & SODIUM PHOSPHATES POWDER PACK 280-160-250 MG 1 PACKET: 280-160-250 PACK at 08:04

## 2022-11-22 ASSESSMENT — ACTIVITIES OF DAILY LIVING (ADL)
DIFFICULTY_EATING/SWALLOWING: NO
ADLS_ACUITY_SCORE: 18
ADLS_ACUITY_SCORE: 35
FALL_HISTORY_WITHIN_LAST_SIX_MONTHS: NO
DIFFICULTY_COMMUNICATING: NO
HEARING_DIFFICULTY_OR_DEAF: NO
CONCENTRATING,_REMEMBERING_OR_MAKING_DECISIONS_DIFFICULTY: NO
DOING_ERRANDS_INDEPENDENTLY_DIFFICULTY: NO
TOILETING_ISSUES: NO
ADLS_ACUITY_SCORE: 18
CHANGE_IN_FUNCTIONAL_STATUS_SINCE_ONSET_OF_CURRENT_ILLNESS/INJURY: NO
ADLS_ACUITY_SCORE: 18
ADLS_ACUITY_SCORE: 18
WEAR_GLASSES_OR_BLIND: NO
ADLS_ACUITY_SCORE: 18
ADLS_ACUITY_SCORE: 18
DRESSING/BATHING_DIFFICULTY: NO
WALKING_OR_CLIMBING_STAIRS_DIFFICULTY: NO

## 2022-11-22 NOTE — PROGRESS NOTES
"CLINICAL NUTRITION SERVICES  -  ASSESSMENT NOTE    Malnutrition:   % Weight Loss:  > 7.5% in 3 months (severe malnutrition)  % Intake:  <75% for > 7 days (moderate malnutrition) - suspected x3 weeks per chart review  Subcutaneous Fat Loss:  Unable to assess   Muscle Loss:  Unable to assess   Fluid Retention:  None noted    Malnutrition Diagnosis: Severe malnutrition in the context of -  Acute illness or injury       REASON FOR ASSESSMENT  Zoran Gonzáles is a 27 year old male seen by Registered Dietitian for Admission Nutrition Risk Screen for positive (wt loss of >34#, yes decreased appetite)      NUTRITION HISTORY  - Information obtained from chart review  - PMH of hx of chemical abuse, T1DM  - admitted for influenza A  - per H&P, pt addicted for nausea, vomiting, general malaise and cough that started 3 weeks ago and got worse starting Saturday.  He notes that 3 weeks ago he started to develop intermittent mitten vomiting but was able to keep food down in between episodes every day or 2 and developed a dry cough at that point.  The symptoms became more frequent and a daily occurrence up until Saturday when he was vomiting nonstop and unable to keep food down until this morning.   - unable to visit with pt today. Per chart review, pt absent from room since this AM. awaiting return until ~1900 before officially discharge.      CURRENT NUTRITION ORDERS  Diet Order: Regular    Current Intake/Tolerance:  - per nursing flow sheet, 100% intakes documented   - per health touch, pt received 1 meal yesterday and 1 today      NUTRITION FOCUSED PHYSICAL ASSESSMENT FOR DIAGNOSING MALNUTRITION)  No: Patient not available                 ANTHROPOMETRICS  Height: 5' 10\"  Weight:  180 lbs 5.38 oz  Body mass index is 25.88 kg/m .   Weight Status: Overweight BMI 25-29.9  IBW:  75.5 kg  %IBW: 108%  Weight History: wt loss of 6.9 kg over past 3 months (7.8%)  11/22/22 : 81.8 kg (180 lb 5.4 oz)  08/21/22 : 88.7 kg (195 lb 9.6 oz) - " per care everywhere  03/23/22 : 71.4 kg (157 lb 6.5 oz) - per care everywhere  09/02/21 : 82.3 kg (181 lb 8 oz) - per care everywhere    LABS  Labs reviewed: K+ 2.7 (L), phos 1.8 (L), BGM   - 11/22: influenza A positive     MEDICATIONS  Medications reviewed: insulin aspart (LSSI), neutra-phos, potassium chloride, IVF @ 125 mL/hr    ASSESSED NUTRITION NEEDS PER APPROVED PRACTICE GUIDELINES:  Dosing Weight: 81.8 kg (actual, most recent wt)  Estimated Energy Needs: 7094-1904 kcals (25-30 Kcal/Kg)  Justification: maintenance  Estimated Protein Needs:  grams protein (1.2-1.5 g pro/Kg)  Justification: preservation of lean body mass  Estimated Fluid Needs: 1 mL/Kcal  Justification: maintenance OR per provider pending fluid status    MALNUTRITION:  % Weight Loss:  > 7.5% in 3 months (severe malnutrition)  % Intake:  <75% for > 7 days (moderate malnutrition) - suspected x3 weeks per chart review  Subcutaneous Fat Loss:  Unable to assess   Muscle Loss:  Unable to assess   Fluid Retention:  None noted    Malnutrition Diagnosis: Severe malnutrition in the context of -  Acute illness or injury    NUTRITION DIAGNOSIS:  Inadequate oral intake related to nausea/vomiting as evidenced by >7.5% wt loss over past 3 months, <75% intake x3 weeks      NUTRITION INTERVENTIONS  Recommendations / Nutrition Prescription  - Nutrition education: Per Provider order if indicated       Implementation  None at this time      Nutrition Goals  Patient to consume % of nutritionally adequate meals TID over the next 5-7 days.    MONITORING AND EVALUATION:  Progress towards goals will be monitored and evaluated per protocol and Practice Guidelines      Hilary Townsend, RD, LD

## 2022-11-22 NOTE — PROGRESS NOTES
MD came to nurses station to ask where pt was. Nursing was with pt less than 45min ago. Walked into pt's room & pt was not there. Pt discontinued himself from [facility]. Pt did not state any reason for leaving prior. Pt removed tele box from himself. Only pt belonging in the room is 1 nike sock.

## 2022-11-22 NOTE — PHARMACY-ADMISSION MEDICATION HISTORY
Admission Medication History Completed by Pharmacy    See [facility] Admission Navigator for allergy information, preferred outpatient pharmacy, prior to admission medications and immunization status.     Medication History Sources:     Patient, chart review    Sure scripts - no recent fill history     [facility] - unable to verify patient information (phone number, address) with what was on file at [facility] so unable to verify medications    Changes made to PTA medication list (reason):    Added: None    Deleted: Fluoxetine 90 mg weekly - not taking per patient    Changed: Albuterol inhaler sched -> PRN    Additional Information:    Patient reports that he hasn't taken his meds for several months, although does endorse that Intuniv and Vyvanse are meds he's supposed to be taking. Unable to verify doses of these - no recent fill history and unable to verify patient information with [facility] that patient indicated he fills medications at.    Prior to Admission medications    Medication Sig Last Dose Taking? Auth Provider Long Term End Date   albuterol (PROVENTIL HFA: VENTOLIN HFA) 108 (90 BASE) MCG/ACT inhaler Inhale 2 puffs into the lungs every 6 hours as needed for shortness of breath / dyspnea More than a month Yes Reported, Patient     guanFACINE HCl (INTUNIV) 4 MG TB24 Take 4 mg by mouth daily (with breakfast). More than a month Yes Reported, Patient     Lisdexamfetamine Dimesylate (VYVANSE PO) Take 60 mg by mouth daily. More than a month Yes Reported, Patient         Date completed: 11/21/22    Medication history completed by: [facility]

## 2022-11-22 NOTE — UTILIZATION REVIEW
"  Admission Status; Secondary Review Determination         Under the authority of the Utilization Management Committee, the utilization review process indicated a secondary review on the above patient.  The review outcome is based on review of the medical records, discussions with staff, and applying clinical experience noted on the date of the review.        (xxx)      Inpatient Status Appropriate - This patient's medical care is consistent with medical management for inpatient care and reasonable inpatient medical practice.      () Observation Status Appropriate - This patient does not meet hospital inpatient criteria and is placed in observation status. If this patient's primary payer is Medicare and was admitted as an inpatient, Condition Code 44 should be used and patient status changed to \"observation\".   () Admission Status NOT Appropriate - This patient's medical care is not consistent with medical management for Inpatient or Observation Status.          RATIONALE FOR DETERMINATION   Zoran Gonzáles is a 27 year old male with a history of seizures related to hyponatremia who was admitted on 11/21/2022 with nausea, vomiting, general malaise and cough that started 3 weeks ago and worsened over the 3 days PTA. In the ER, lab work was significant for a potassium of 2.2, sodium of 125, ionized calcium of 3.8 and a white blood cell count of 14.5.  A urine drug screen was positive for THC.  Chest x-ray revealed a possible right middle lobe pneumonia with no significant fluid or mass-effect.  Influenza A positive.  He was admitted for IVF, IV electrolyte replacement, close clinical monitoring and further work up.  IP status is appropriate.    The severity of illness, intensity of service provided, expected LOS and risk for adverse outcome make the care complex, high risk and appropriate for hospital admission.        The information on this document is developed by the utilization review team in order for the " business office to ensure compliance.  This only denotes the appropriateness of proper admission status and does not reflect the quality of care rendered.         The definitions of Inpatient Status and Observation Status used in making the determination above are those provided in the CMS Coverage Manual, Chapter 1 and Chapter 6, section 70.4.      Sincerely,     Izabella Cook MD  Physician Advisor   Utilization Review/ Case Management  [facility].

## 2022-11-22 NOTE — PROGRESS NOTES
Attempted to see pt at 1100, but pt was not in his room. Spoke with nursing and unsure where he went as he did not communicate with them. Nursing had seen pt ~45 minutes prior. Left voicemail message for pt in order to see if he was going to come back in. No answer. If pt returns call and doesn't want to come back in, would order Tamiflu course to finish OP and to go to an ED if he becomes ill again. If he returns again,will check electrolytes. Na stabilizing at 134, but low K ~2.7, Mg 2, phos 1.8. Had been tolerating PO per nursing notes prior to discharge. Had ordered BMP, Mg, Phos this AM, but not drawn prior to his departure. Awaiting return until ~1900 before officially discharge. Appeared to have been given ~60 mEq of K, and 1 packet of Neutro phos prior to leaving.     Potassium   Date Value Ref Range Status   11/22/2022 2.7 (L) 3.4 - 5.3 mmol/L Final   11/22/2022 2.7 (L) 3.4 - 5.3 mmol/L Final   10/11/2012 4.1 3.4 - 5.3 mmol/L Final     Potassium POCT   Date Value Ref Range Status   11/21/2022 2.8 (L) 3.4 - 5.3 mmol/L Final       Magnesium   Date Value Ref Range Status   11/22/2022 2.0 1.7 - 2.3 mg/dL Final     Sodium   Date Value Ref Range Status   11/22/2022 134 (L) 136 - 145 mmol/L Final   10/11/2012 141 133 - 143 mmol/L Final       Please notify Gold service if he returns or calls back.     Princess Saldana, CNP, APRN  Internal Medicine [facility] Hospitalist  [facility]

## 2022-11-22 NOTE — PLAN OF CARE
Goal Outcome Evaluation:  [phone]  AOX4. Patient is independent . Patient was fatigued but also requested different types of food. Regular diet. One PIV right side. Denies pain. Droplet precautions. Continous cardiac monitoring. Lab tested for MRSA.Positive for influenza A. Potassium was replaced please check in 4 hours after oral tablet are given.

## 2022-11-22 NOTE — PLAN OF CARE
Goal Outcome Evaluation:           Overall Patient Progress: no changeOverall Patient Progress: no change    Outcome Evaluation: unable to meet with pt today as pt has been out of room. eating 100% of meals this admit.    Hilary Townsend RD, LD

## 2022-11-23 NOTE — PLAN OF CARE
Goal Outcome Evaluation:      Plan of Care Reviewed With: patient    Overall Patient Progress: no changeOverall Patient Progress: no change    Outcome Evaluation: Admitted for hyperglycemia. BG 97 this AM. No sliding scale insulin needed per administration instructions. Na 124. K 2.7, replaced this AM, but unable to recheck d/t pt leaving the unit w/o warning. Pt off unit for >8hrs, so pt is now discharged AMA.

## 2022-11-23 NOTE — DISCHARGE SUMMARY
[facility]  Hospitalist Discharge Summary      Date of Admission:  11/21/2022  Date of Discharge:  11/22/2022  6:50 PM  Discharging Provider: MATEUS Chavez CNP  Discharge Service: Hospitalist Service, GOLD TEAM 5    Discharge Diagnoses   Influenza A   Possible CAP  Nausea and vomiting   Leukocytosis  Hyponatremia  Hypokalemia  Hypomcalcemia  Hypophosphatemia     Follow-ups Needed After Discharge   Follow-up with PCP  Presents to ED if return of cyclical vomiting     Unresulted Labs Ordered in the Past 30 Days of this Admission     Date and Time Order Name Status Description    11/21/2022  7:39 PM Blood Culture Arm, Left Preliminary     11/21/2022  7:39 PM Blood Culture Hand, Left Preliminary       These results will be followed up by medicine pool.    Discharge Disposition   Discharged to home?   Condition at discharge: Guarded, left AMA, unreachable by phone    Hospital Course    Zoran Gonzáles is a 27 year old male admitted on 11/21/2022 with a PMHx of seizure d/t hyponatremia, lyte disturbances (K, Na, Cl, Phos), adjustment disorder, MDD, ODD, ADHD. FAS, and  non-compliance. He presented to the ER on 11/21 for nausea, vomiting, general malaise and cough that started 3 weeks ago.  He notes that 3 weeks ago he started to develop intermittent vomiting but was able to keep food down in between episodes and developed a dry cough at that point.  His symptoms became more frequent and a daily occurrence up until Saturday when he was vomiting nonstop, unable to keep food down, felt shaky, lightheaded, dizzy, and subsequently called 911. POCT BG ~500. Bought to [facility] ED. Upon arrival, hemodynamically stable, BG in 100s, no additional O2 needed. Found to have electrolyte derangements(K, Na, Phos, Ca), THC positive on Utox, and a CXR with  possible right middle lobe pneumonia with no significant fluid or mass-effect, BG in the 100's, and positive influenza A  test.  Per chart review, pt has presented in similar manner to OSH and has left AMA before. Pt left AMA this morning without telling staff. Called and left a voicemail, left unit number. No response as of 1700 on 11/22.     Intermittent nausea/vomiting x3 weeks, THC related cyclical vomiting vs gastroenteritis  Query if this is cyclical vomiting 2/2 THC usage given positive test, clinical hx. Pt able to tolerate food upon arrival to the ED. Nause decreasing after arrival and coming in waves.  IVF, Protonix given. Appeared to have eaten prior to leaving.     Influenza A   Possible CAP  Leukocytosis  Influenza A +. WBC 14.5-->11. Procal 0.04. CXR with possible right middle lobe pneumonia with no significant fluid or mass-effect. BCx with NGTD.  Tamiflu 75mg twice daily x 5 days started. If pt called back, would write Rx to complete course.     Hypokalemia   Thought to be d/t vomiting. 2.2 on arrival. Given replacement. Rechecks at 2.7. Mg normal. Reordered BMP, but left before it could be drawn.      Hyponatremia   Thought to be secondary to vomiting/dehydration with a history of seizures with hyponatremia. 123 on arrival. Per chart review, had a seizure at OSH when Na was 121. Improvement to 134 with IV NS.  Reordered BMP, but left before it could be drawn.     Hypophosphatemia   Thought to be secondary to vomiting, likely poor nutrition. 1.8-2 on arrival. Replacement given.     Hypocalcemia  Thought to be secondary to vomiting. IC 3.8, Calcium 8.3. Was trending.     Consultations This Hospital Stay   None    Code Status   Prior    Time Spent on this Encounter   I, MATEUS Chavez CNP, DID NOT see the patient today and spent less than or equal to 30 minutes discharging this patient.       MATEUS Chavez CNP  [facility] [address]  [address] [address]  Phone: [phone]  ______________________________________________________________________    Physical Exam   Vital Signs:  Temp: 97.8  F (36.6  C) Temp src: Oral BP: 136/79 Pulse: 71   Resp: 20 SpO2: 100 % O2 Device: None (Room air)    Weight: 180 lbs 5.38 oz     Patient was not assessed as he left against medical advice before assessment could be made.        Primary Care Physician   Physician No Ref-Primary    Discharge Orders   No discharge procedures on file.    Significant Results and Procedures   Most Recent 3 CBC's:Recent Labs   Lab Test 11/22/22 0446 11/21/22 1429 11/21/22 1427   WBC 11.0  --  14.5*   HGB 11.8* 15.3 14.4   MCV 77*  --  75*     --  445     Most Recent 3 BMP's:Recent Labs   Lab Test 11/22/22  0728 11/22/22 0446 11/21/22 2217 11/21/22 1952 11/21/22 1934 11/21/22  1436 11/21/22 1429 11/21/22 1427   NA  --  134*  --   --  124*  --  123* 125*   POTASSIUM  --  2.7*  2.7*  --   --  2.7*  --  2.8* 2.2*   CHLORIDE  --  91*  --   --   --   --   --  76*   CO2  --  18*  --   --   --   --   --  32*   BUN  --  13.6  --   --   --   --   --  15.1   CR  --  0.93  --   --   --   --   --  0.98   ANIONGAP  --  25*  --   --   --   --   --  17*   KEO  --  8.3*  --   --   --   --   --  8.9   GLC 97 90 99   < >  --    < > 104* 93    < > = values in this interval not displayed.     Most Recent ESR & CRP:Recent Labs   Lab Test 11/21/22 1427   CRP <3.00   ,   Results for orders placed or performed during the hospital encounter of 11/21/22   XR Chest Port 1 View    Narrative    EXAM: XR CHEST PORT 1 VIEW 11/21/2022 3:25 PM    HISTORY: 27-year-old male with cough and leukocytosis.    COMPARISON: Outside hospital chest radiograph 3/31/2022, 9/2/2021 (no  images available, only interpretation).    TECHNIQUE: Portable AP view of the chest.    FINDINGS:   Midline trachea. Normal cardiomediastinal silhouette. Distinct  pulmonary vasculature. No significant pleural effusion. No discernible  pneumothorax. Normal lung volumes. Subtle right middle lobe airspace  consolidation. Unremarkable upper abdomen. No acute or  suspicious  osseous abnormalities. Unremarkable soft tissues.      Impression    IMPRESSION: Possible right middle lobe pneumonia. Upright PA and  lateral chest radiographs of the helpful for further detail..    I have personally reviewed the examination and initial interpretation  and I agree with the findings.    SAMRA FREIRE MD         SYSTEM ID:  [id number]       Discharge Medications   Discharge Medication List as of 11/22/2022  6:50 PM      CONTINUE these medications which have NOT CHANGED    Details   albuterol (PROVENTIL HFA: VENTOLIN HFA) 108 (90 BASE) MCG/ACT inhaler Inhale 2 puffs into the lungs every 6 hours as needed for shortness of breath / dyspnea, Historical      guanFACINE HCl (INTUNIV) 4 MG TB24 Take 4 mg by mouth daily (with breakfast)., Historical      Lisdexamfetamine Dimesylate (VYVANSE PO) Take 60 mg by mouth daily., 60 mg, Oral, DAILY, Until Discontinued, Historical           Allergies   No Known Allergies

## 2022-11-26 LAB
BACTERIA BLD CULT: NO GROWTH
BACTERIA BLD CULT: NO GROWTH

## 2022-11-27 NOTE — PROGRESS NOTES
Patient noted to have diagnosis of severe malnutrition in the context of acute illness or injury by RD after he discharged. I was unable to assess patient before he left against medical advice before my assessment, so I cannot confirm. This is an insignificant finding.     Princess Saldana, CNP, APRN  Internal Medicine [facility] Hospitalist  [facility]\

## 2024-08-20 ENCOUNTER — HOSPITAL ENCOUNTER (EMERGENCY)
Facility: CLINIC | Age: 29
Discharge: HOME | End: 2024-08-20
Attending: FAMILY MEDICINE | Admitting: FAMILY MEDICINE
Payer: COMMERCIAL

## 2024-08-20 VITALS
HEIGHT: 70 IN | DIASTOLIC BLOOD PRESSURE: 90 MMHG | TEMPERATURE: 98.6 F | OXYGEN SATURATION: 99 % | HEART RATE: 87 BPM | SYSTOLIC BLOOD PRESSURE: 146 MMHG | BODY MASS INDEX: 25.48 KG/M2 | RESPIRATION RATE: 14 BRPM | WEIGHT: 178 LBS

## 2024-08-20 DIAGNOSIS — K04.7 DENTAL ABSCESS: ICD-10-CM

## 2024-08-20 PROCEDURE — 250N000013 HC RX MED GY IP 250 OP 250 PS 637: Performed by: FAMILY MEDICINE

## 2024-08-20 PROCEDURE — 99284 EMERGENCY DEPT VISIT MOD MDM: CPT | Performed by: FAMILY MEDICINE

## 2024-08-20 RX ORDER — ACETAMINOPHEN 500 MG
1000 TABLET ORAL ONCE
Status: COMPLETED | OUTPATIENT
Start: 2024-08-20 | End: 2024-08-20

## 2024-08-20 RX ORDER — OXYCODONE HYDROCHLORIDE 5 MG/1
5 TABLET ORAL EVERY 6 HOURS PRN
Qty: 10 TABLET | Refills: 0 | Status: SHIPPED | OUTPATIENT
Start: 2024-08-20 | End: 2024-08-23

## 2024-08-20 RX ORDER — PENICILLIN V POTASSIUM 500 MG/1
500 TABLET, FILM COATED ORAL ONCE
Status: COMPLETED | OUTPATIENT
Start: 2024-08-20 | End: 2024-08-20

## 2024-08-20 RX ORDER — OXYCODONE HYDROCHLORIDE 5 MG/1
5 TABLET ORAL ONCE
Status: COMPLETED | OUTPATIENT
Start: 2024-08-20 | End: 2024-08-20

## 2024-08-20 RX ORDER — PENICILLIN V POTASSIUM 500 MG/1
500 TABLET, FILM COATED ORAL 3 TIMES DAILY
Qty: 21 TABLET | Refills: 0 | Status: SHIPPED | OUTPATIENT
Start: 2024-08-20 | End: 2024-08-27

## 2024-08-20 RX ADMIN — PENICILLIN V POTASSIUM 500 MG: 500 TABLET, FILM COATED ORAL at 13:11

## 2024-08-20 RX ADMIN — ACETAMINOPHEN 1000 MG: 500 TABLET ORAL at 13:12

## 2024-08-20 RX ADMIN — OXYCODONE HYDROCHLORIDE 5 MG: 5 TABLET ORAL at 13:12

## 2024-08-20 ASSESSMENT — COLUMBIA-SUICIDE SEVERITY RATING SCALE - C-SSRS
2. HAVE YOU ACTUALLY HAD ANY THOUGHTS OF KILLING YOURSELF IN THE PAST MONTH?: NO
1. IN THE PAST MONTH, HAVE YOU WISHED YOU WERE DEAD OR WISHED YOU COULD GO TO SLEEP AND NOT WAKE UP?: NO
6. HAVE YOU EVER DONE ANYTHING, STARTED TO DO ANYTHING, OR PREPARED TO DO ANYTHING TO END YOUR LIFE?: NO

## 2024-08-20 ASSESSMENT — ACTIVITIES OF DAILY LIVING (ADL)
ADLS_ACUITY_SCORE: 35
ADLS_ACUITY_SCORE: 33
ADLS_ACUITY_SCORE: 35

## 2024-08-20 NOTE — ED PROVIDER NOTES
ED Provider Note  [facility]      History     Chief Complaint   Patient presents with    Jaw Pain     L upper tooth pain for 3 days. Visible swelling      HPI  Zoran VIVIAN Gonzáles is a 28 year old male with a past medical history significant for FAS, polysubstance abuse, asthma, anxiety and depression who presents to the Emergency Department for evaluation of jaw pain.   Records are in epic.Patient notes left upper tooth which appears to be #15 of broken off a month ago.  Of last couple days now it is getting worse and now causing a lot of pain and no active drainage.  No fevers no neck stiffness otherwise able to swallow breathing okay.  Now presents here for evaluation.  Has been taking ibuprofen for pain and also Benadryl.          Past Medical History  Past Medical History:   Diagnosis Date    ADHD (attention deficit hyperactivity disorder)     Asthma     ODD (oppositional defiant disorder)      No past surgical history on file.  oxyCODONE (ROXICODONE) 5 MG tablet  penicillin V (VEETID) 500 MG tablet  albuterol (PROVENTIL HFA: VENTOLIN HFA) 108 (90 BASE) MCG/ACT inhaler  guanFACINE HCl (INTUNIV) 4 MG TB24  Lisdexamfetamine Dimesylate (VYVANSE PO)      No Known Allergies  Family History  Family History   Problem Relation Age of Onset    Substance Abuse Mother     Bipolar Disorder Mother     Depression Mother     Anxiety Disorder Mother     Substance Abuse Father     Substance Abuse Maternal Grandmother     Substance Abuse Maternal Grandfather     Depression Sister     Anxiety Disorder Sister     Mental Illness Sister     Diabetes Mother     Diabetes Sister     Diabetes Maternal Uncle      Social History   Social History     Tobacco Use    Smoking status: Every Day     Current packs/day: 0.10     Average packs/day: 0.1 packs/day for 2.0 years (0.2 ttl pk-yrs)     Types: Cigarettes    Smokeless tobacco: Never   Substance Use Topics    Alcohol use: Yes     Comment: 2x monthly    Drug use:  "Yes     Types: Marijuana     Comment: THC-last use 9/5/2012      A medically appropriate review of systems was performed with pertinent positives and negatives noted in the HPI, and all other systems negative.    Physical Exam   BP: (!) 152/102  Pulse: 94  Temp: 98.6  F (37  C)  Resp: 16  Height: 177.8 cm (5' 10\")  Weight: 80.7 kg (178 lb)  SpO2: 99 %  Physical Exam  Vitals and nursing note reviewed.   Constitutional:       General: He is in acute distress.      Appearance: He is well-developed. He is not toxic-appearing or diaphoretic.      Comments: Patient with some mild facial swelling of the left upper zygomatic area but does not ensure the eye etc.  There is no hyperemia.  Airway is intact   HENT:      Head: Normocephalic and atraumatic.      Nose: Nose normal. No congestion.      Mouth/Throat:      Mouth: Mucous membranes are moist.      Pharynx: Oropharynx is clear.      Comments: Tooth #15 broken at the gumline.  There is no appreciable swelling without active drainage and tenderness no in the buccal aspect of the gums.  No trismus floor the mouth is intact  Eyes:      General: No scleral icterus.     Extraocular Movements: Extraocular movements intact.      Conjunctiva/sclera: Conjunctivae normal.      Pupils: Pupils are equal, round, and reactive to light.   Neck:      Comments: Trach is midline no sign of any crepitus or concerns for any Dionte's angina  Cardiovascular:      Rate and Rhythm: Normal rate and regular rhythm.   Pulmonary:      Effort: Pulmonary effort is normal. No respiratory distress.      Breath sounds: No stridor.   Abdominal:      General: Abdomen is flat.      Tenderness: There is no guarding.   Musculoskeletal:         General: No tenderness or deformity. Normal range of motion.      Cervical back: Normal range of motion and neck supple.   Lymphadenopathy:      Cervical: No cervical adenopathy.   Skin:     General: Skin is warm and dry.      Capillary Refill: Capillary refill takes " less than 2 seconds.      Coloration: Skin is not jaundiced.      Findings: Erythema present. No rash.      Comments: Minimal erythema of the left zygoma above the dental margin   Neurological:      General: No focal deficit present.      Mental Status: He is alert and oriented to person, place, and time.      Cranial Nerves: No cranial nerve deficit.      Sensory: No sensory deficit.      Coordination: Coordination normal.   Psychiatric:      Comments: Slightly uncomfortable otherwise appropriate           ED Course, Procedures, & Data      Patient seen the ER records reviewed in epic.  Patient been seen for altered mental status in the past.  History of polysubstance use also seen by myself for nausea vomiting also some chest pain hypokalemia etc. history of opioid abuse.    Here in the ER there is certainly signs of infection at this point outpatient dentistry in the meantime we will give him 5 mg of Pen-Vee K along with this 1 5 mg oxycodone along with a gram of Tylenol and will discuss with dentistry    Patient resting comfortably in the ER.  Was given pain medications etc. antibiotics.  Patient seen by dentistry as they were consulted incision and drainage done patient Toller procedure well.  Patient this point comfortably discharged with antibiotics along with pain control following with dentistry along with salt water rinses etc. return if worsening symptoms      Procedures                 No results found for any visits on 08/20/24.  Medications   oxyCODONE (ROXICODONE) tablet 5 mg (5 mg Oral $Given 8/20/24 1312)   acetaminophen (TYLENOL) tablet 1,000 mg (1,000 mg Oral $Given 8/20/24 1312)   penicillin V (VEETID) tablet 500 mg (500 mg Oral $Given 8/20/24 1311)     Labs Ordered and Resulted from Time of ED Arrival to Time of ED Departure - No data to display  No orders to display          Critical care was not performed.     Medical Decision Making  The patient's presentation was of moderate complexity (an  acute illness with systemic symptoms).    The patient's evaluation involved:  review of external note(s) from 3+ sources (see separate area of note for details)  review of 3+ test result(s) ordered prior to this encounter (see separate area of note for details)  ordering and/or review of 3+ test(s) in this encounter (see separate area of note for details)  discussion of management or test interpretation with another health professional (see separate area of note for details)    The patient's management necessitated moderate risk (prescription drug management including medications given in the ED).    Procedure Supervision Attestation:  I was present for the critical and key portions of dental incision and drainage with details in the note by dental resident dated 8/20/24, and I was immediately available throughout the procedure.    Assessment & Plan   28-year male with history of dental abscess tooth #15.  No sign of Dionte's angina etc.  Patient otherwise stable here in the ER.  No trismus noted patient given penicillin orally here in the ER.  Oxycodone for pain control dental consult drainage of the abscess no complications noted sent home on Pen-Vee K following up with dentistry oxycodone for pain Tylenol ibuprofen salt water rinses and return if worsening symptoms at all.       I have reviewed the nursing notes. I have reviewed the findings, diagnosis, plan and need for follow up with the patient.    Discharge Medication List as of 8/20/2024  5:29 PM        START taking these medications    Details   oxyCODONE (ROXICODONE) 5 MG tablet Take 1 tablet (5 mg) by mouth every 6 hours as needed for pain, Disp-10 tablet, R-0, Local Print      penicillin V (VEETID) 500 MG tablet Take 1 tablet (500 mg) by mouth 3 times daily for 7 days, Disp-21 tablet, R-0, Local Print             Final diagnoses:   Dental abscess - I & D in ED #15         [facility] EMERGENCY DEPARTMENT  8/20/2024    This note was created at  least in part by the use of dragon voice dictation system. Inadvertent typographical errors may still exist.  Sergio Jerome MD.  Patient evaluated in the emergency department during the COVID-19 pandemic period. Careful attention to patients safety was addressed throughout the evaluation. Evaluation and treatment management was initiated with disposition made efficiently and appropriate as possible to minimize any risk of potential exposure to patient during this evaluation.       Sergio Jerome MD  08/20/24 2020

## 2024-08-20 NOTE — DISCHARGE INSTRUCTIONS
Home.  You had the abscess drained in the ER by dentistry.  Salt water rinses frequently daily for next few days.  Take the Pen VK for infection.  Tylenol and Ibuprofen for pain.  Oxycodone for breakthrough pain.  Follow up with dentist  Return if worse symptoms.    Many of these clinics offer a sliding fee option for patients that qualify, and see patients on a walk-in or same day basis. Please call each clinic directly. As services, hours, fees and policies vary greatly.    [place]:  [facility]     [phone]  [facility] ([facility]) [phone]  [facility]  [phone]  [facility]      [phone]   [facility]    [phone]  [facility]  [phone]  [facility] ([facility]) [phone]  [facility]     [phone]  [facility]   [phone]  [facility] (cash only)   [phone]  [facility]    [phone] (adults)          [phone] (children)    [place]:  [facility]     [phone]; [phone]  [facility]     [phone]  [facility]     [phone]  [facility] (free, limited)    [phone]    Multiple Locations:  [facility]       [phone]

## 2024-08-20 NOTE — ED TRIAGE NOTES
Broken tooth. L upper. 600mg ibuprfen, and 50mg benadryl taken at 10am today with no relief. L cheek visibly swollen in triage.      Triage Assessment (Adult)       Row Name 08/20/24 1214 08/20/24 1200       Triage Assessment    Airway WDL WDL --       Respiratory WDL    Respiratory WDL WDL --       Skin Circulation/Temperature WDL    Skin Circulation/Temperature WDL WDL --       Cardiac WDL    Cardiac WDL WDL  HTN --       Chest Pain Assessment    Chest Pain Location -- --    Character -- --    Duration -- awake all night due to pain.    Chest Pain Intervention -- --       Peripheral/Neurovascular WDL    Peripheral Neurovascular WDL WDL --       Cognitive/Neuro/Behavioral WDL    Cognitive/Neuro/Behavioral WDL WDL --

## 2024-08-22 NOTE — CONSULTS
[facility] Emergency Department - Incision and Drainage      Patient:   Zoran Gonzáles    Date of birth 1995   MRN:  [id number]   Date of Visit:  8/20/2024   Date of Admission 8/20/2024   Consult Requested by Dr. Jerome       Assessment     Zoran Gonzáles is a 28 year old male with a past medical history significant for  FAS, polysubstance abuse, asthma, anxiety and depression . Diagnosis upon admission No admission diagnoses are documented for this encounter..   Dental exam concerning for swelling of the  vestibular space, left maxillary,  noted, associated with #15-upper left 2nd molar.     Progress note   Proposed treatment for Zoran Gonzáles included, but were not limited to: incision and drainage, application of local anesthetic as palliative care, antibiotic therapy, Dental CT, and no treatment.   Risks, benefits, and alternatives were discussed with the patient. All questions were invited and answered until patient was satisfied, treatment decided upon by the patient and provider: Incision and drainage.   Incision and drainage:   Anesthesia achieved with 5.1_cc  Lidocaine 2% w/ epi 1:100,000, 1.7_cc  Articaine 4% w/epi 1:100,000 given via PSA block on the left mandible/maxilla.   Incision placed on left maxillary buccal vestibule  Facial to #15 and extending to the Mesial of #14.  Purulence and heme expressed, incision site rinsed with copious amounts of sterile saline solution.   Patient given post-op instructions verbally and written electronically     Follow-up    At this time, clinical and radiographic findings do not indicate the need for any urgent dental care. Zoran Gonzáles may fmay follow up with an outside provider of their choosing to further assess and resolve, as needed, the findings discussed in this note. Below is contact information for potential dental care, if needed.   [facility]: [phone] ---- [address] [place] [address] or [facility] (will  help find Provider)     [phone]  We encourage Zoran Gonzáles to seek follow-up care at the [facility] dental clinic, or a private dentist of their choice, for a second examination and any acute care necessary.   [facility]: [phone] ---- [address] [place] [address] or [facility] (will help find Provider)     [phone]  Recommended medications at this time:   Chlorhexidine gluconate 0.12% rinse, pre and post surgery for three weeks in total.           ___________________________________________________________________  Thank you for allowing us to participate in the care of this patient,  Direct any further questions to:     Juancho Garza MD DMD  Dental Fellow  Pager: [phone]    Patient discussed with: Redd Gillis DDS, TRISTEN,     Clinic contact information:   [facility]  [facility]  [address] [place] [address]  [address] [place] [address]  Phone:[phone]    Patient review   HPI:   Patient Health history: History is obtained from the patient    ASA 1 - Normal health patient    Vitals were reviewed                       See H&P for complete ROS.     Medical, Surgical, Social History       Past Medical History:   Diagnosis Date    ADHD (attention deficit hyperactivity disorder)     Asthma     ODD (oppositional defiant disorder)          No past surgical history on file.      Social History     Tobacco Use    Smoking status: Every Day     Current packs/day: 0.10     Average packs/day: 0.1 packs/day for 2.0 years (0.2 ttl pk-yrs)     Types: Cigarettes    Smokeless tobacco: Never   Substance Use Topics    Alcohol use: Yes     Comment: 2x monthly         Family History   Problem Relation Age of Onset    Substance Abuse Mother     Bipolar Disorder Mother     Depression Mother     Anxiety Disorder Mother     Substance Abuse Father     Substance Abuse Maternal Grandmother      Substance Abuse Maternal Grandfather     Depression Sister     Anxiety Disorder Sister     Mental Illness Sister     Diabetes Mother     Diabetes Sister     Diabetes Maternal Uncle          Immunizations and Allergies       There is no immunization history on file for this patient.      No Known Allergies      Medication     Prior to Admission Medications       No current facility-administered medications for this encounter.     Current Outpatient Medications   Medication Sig Dispense Refill    oxyCODONE (ROXICODONE) 5 MG tablet Take 1 tablet (5 mg) by mouth every 6 hours as needed for pain 10 tablet 0    penicillin V (VEETID) 500 MG tablet Take 1 tablet (500 mg) by mouth 3 times daily for 7 days 21 tablet 0    albuterol (PROVENTIL HFA: VENTOLIN HFA) 108 (90 BASE) MCG/ACT inhaler Inhale 2 puffs into the lungs every 6 hours as needed for shortness of breath / dyspnea      guanFACINE HCl (INTUNIV) 4 MG TB24 Take 4 mg by mouth daily (with breakfast).      Lisdexamfetamine Dimesylate (VYVANSE PO) Take 60 mg by mouth daily.           Clinical Exam   Extra-Oral: No submandibular lymphadenopathy, no induration, no abnormal skin lesions, inferior border of mandible is palpable bilaterally, BOSTON >45mm. No physical impediment from TMJ bilaterally. No clicking of TMJ on opening and lateral movements. Infraorbital region showed mild swelling, no induration, no contusions, and mild erythema left side in area of zygoma. Patient able to open and close both eyes without obstruction. Patient exhibits no obstructions during breathing.         Intraoral Findings   Lips Pink   Mucosal quality watery and clear     Alveolar ridges  Ridges had no significant findings    Tongue Pink   Gingiva inflamed and enlarged/swollen    Cheeks inflamed    Hard palate  coral pink, scalloped/fills interdental spaces, stippled, minimal to no bleeding on probing   Floor of Mouth No abnormal soft tissue findings upon intraoral examination   Dentition Adult  dentition with decay  Teeth #  15 noted as fractured       Biofilm, General level mild   Denture(s) No removable prosthetic device(s) used   Care provided during assessment Oral Assessment and Patient education                                                              Imaging     No imaging taken.

## 2024-09-13 ENCOUNTER — APPOINTMENT (OUTPATIENT)
Dept: CT IMAGING | Facility: CLINIC | Age: 29
End: 2024-09-13
Payer: COMMERCIAL

## 2024-09-13 ENCOUNTER — APPOINTMENT (OUTPATIENT)
Dept: ULTRASOUND IMAGING | Facility: CLINIC | Age: 29
End: 2024-09-13
Payer: COMMERCIAL

## 2024-09-13 ENCOUNTER — HOSPITAL ENCOUNTER (OUTPATIENT)
Facility: CLINIC | Age: 29
Setting detail: OBSERVATION
Discharge: LEFT BEFORE DISCHARGE | End: 2024-09-14
Attending: EMERGENCY MEDICINE | Admitting: INTERNAL MEDICINE
Payer: COMMERCIAL

## 2024-09-13 DIAGNOSIS — R11.2 NAUSEA AND VOMITING: ICD-10-CM

## 2024-09-13 DIAGNOSIS — E87.1 HYPONATREMIA: ICD-10-CM

## 2024-09-13 DIAGNOSIS — E87.6 HYPOKALEMIA: ICD-10-CM

## 2024-09-13 DIAGNOSIS — F12.90 CANNABINOID HYPEREMESIS SYNDROME: ICD-10-CM

## 2024-09-13 DIAGNOSIS — R11.2 CANNABINOID HYPEREMESIS SYNDROME: ICD-10-CM

## 2024-09-13 LAB
ALBUMIN SERPL BCG-MCNC: 4.8 G/DL (ref 3.5–5.2)
ALBUMIN UR-MCNC: 50 MG/DL
ALP SERPL-CCNC: 103 U/L (ref 40–150)
ALT SERPL W P-5'-P-CCNC: 18 U/L (ref 0–70)
ANION GAP SERPL CALCULATED.3IONS-SCNC: 11 MMOL/L (ref 7–15)
ANION GAP SERPL CALCULATED.3IONS-SCNC: 15 MMOL/L (ref 7–15)
APPEARANCE UR: CLEAR
AST SERPL W P-5'-P-CCNC: 33 U/L (ref 0–45)
BASE EXCESS BLDV CALC-SCNC: 7.5 MMOL/L (ref -3–3)
BASOPHILS # BLD AUTO: ABNORMAL 10*3/UL
BASOPHILS # BLD MANUAL: 0 10E3/UL (ref 0–0.2)
BASOPHILS NFR BLD AUTO: ABNORMAL %
BASOPHILS NFR BLD MANUAL: 0 %
BILIRUB SERPL-MCNC: 2.5 MG/DL
BILIRUB UR QL STRIP: NEGATIVE
BUN SERPL-MCNC: 14.7 MG/DL (ref 6–20)
BUN SERPL-MCNC: 17.5 MG/DL (ref 6–20)
CALCIUM SERPL-MCNC: 9 MG/DL (ref 8.8–10.4)
CALCIUM SERPL-MCNC: 9.7 MG/DL (ref 8.8–10.4)
CHLORIDE SERPL-SCNC: 77 MMOL/L (ref 98–107)
CHLORIDE SERPL-SCNC: 85 MMOL/L (ref 98–107)
COLOR UR AUTO: YELLOW
CREAT SERPL-MCNC: 1.1 MG/DL (ref 0.67–1.17)
CREAT SERPL-MCNC: 1.27 MG/DL (ref 0.67–1.17)
EGFRCR SERPLBLD CKD-EPI 2021: 79 ML/MIN/1.73M2
EGFRCR SERPLBLD CKD-EPI 2021: >90 ML/MIN/1.73M2
EOSINOPHIL # BLD AUTO: ABNORMAL 10*3/UL
EOSINOPHIL # BLD MANUAL: 0 10E3/UL (ref 0–0.7)
EOSINOPHIL NFR BLD AUTO: ABNORMAL %
EOSINOPHIL NFR BLD MANUAL: 0 %
ERYTHROCYTE [DISTWIDTH] IN BLOOD BY AUTOMATED COUNT: 13.1 % (ref 10–15)
FLUAV RNA SPEC QL NAA+PROBE: NEGATIVE
FLUBV RNA RESP QL NAA+PROBE: NEGATIVE
GLUCOSE SERPL-MCNC: 105 MG/DL (ref 70–99)
GLUCOSE SERPL-MCNC: 113 MG/DL (ref 70–99)
GLUCOSE UR STRIP-MCNC: NEGATIVE MG/DL
HCO3 BLDV-SCNC: 31 MMOL/L (ref 21–28)
HCO3 SERPL-SCNC: 27 MMOL/L (ref 22–29)
HCO3 SERPL-SCNC: 36 MMOL/L (ref 22–29)
HCT VFR BLD AUTO: 47.7 % (ref 40–53)
HGB BLD-MCNC: 16.3 G/DL (ref 13.3–17.7)
HGB UR QL STRIP: NEGATIVE
IMM GRANULOCYTES # BLD: ABNORMAL 10*3/UL
IMM GRANULOCYTES NFR BLD: ABNORMAL %
KETONES UR STRIP-MCNC: 20 MG/DL
LEUKOCYTE ESTERASE UR QL STRIP: NEGATIVE
LIPASE SERPL-CCNC: 19 U/L (ref 13–60)
LYMPHOCYTES # BLD AUTO: ABNORMAL 10*3/UL
LYMPHOCYTES # BLD MANUAL: 1.7 10E3/UL (ref 0.8–5.3)
LYMPHOCYTES NFR BLD AUTO: ABNORMAL %
LYMPHOCYTES NFR BLD MANUAL: 16 %
MAGNESIUM SERPL-MCNC: 2 MG/DL (ref 1.7–2.3)
MCH RBC QN AUTO: 26.7 PG (ref 26.5–33)
MCHC RBC AUTO-ENTMCNC: 34.2 G/DL (ref 31.5–36.5)
MCV RBC AUTO: 78 FL (ref 78–100)
MONOCYTES # BLD AUTO: ABNORMAL 10*3/UL
MONOCYTES # BLD MANUAL: 1.1 10E3/UL (ref 0–1.3)
MONOCYTES NFR BLD AUTO: ABNORMAL %
MONOCYTES NFR BLD MANUAL: 10 %
MUCOUS THREADS #/AREA URNS LPF: PRESENT /LPF
NEUTROPHILS # BLD AUTO: ABNORMAL 10*3/UL
NEUTROPHILS # BLD MANUAL: 7.8 10E3/UL (ref 1.6–8.3)
NEUTROPHILS NFR BLD AUTO: ABNORMAL %
NEUTROPHILS NFR BLD MANUAL: 74 %
NITRATE UR QL: NEGATIVE
NRBC # BLD AUTO: 0 10E3/UL
NRBC BLD AUTO-RTO: 0 /100
O2/TOTAL GAS SETTING VFR VENT: 21 %
OSMOLALITY SERPL: 268 MMOL/KG (ref 275–295)
OSMOLALITY UR: 897 MMOL/KG (ref 100–1200)
OXYHGB MFR BLDV: 85 % (ref 70–75)
PCO2 BLDV: 38 MM HG (ref 40–50)
PH BLDV: 7.52 [PH] (ref 7.32–7.43)
PH UR STRIP: 8.5 [PH] (ref 5–7)
PHOSPHATE SERPL-MCNC: 2.6 MG/DL (ref 2.5–4.5)
PLAT MORPH BLD: NORMAL
PLATELET # BLD AUTO: 344 10E3/UL (ref 150–450)
PO2 BLDV: 49 MM HG (ref 25–47)
POTASSIUM SERPL-SCNC: 2.9 MMOL/L (ref 3.4–5.3)
POTASSIUM SERPL-SCNC: 3.2 MMOL/L (ref 3.4–5.3)
PROCALCITONIN SERPL IA-MCNC: 0.04 NG/ML
PROT SERPL-MCNC: 8.4 G/DL (ref 6.4–8.3)
RBC # BLD AUTO: 6.11 10E6/UL (ref 4.4–5.9)
RBC MORPH BLD: NORMAL
RBC URINE: 1 /HPF
RSV RNA SPEC NAA+PROBE: NEGATIVE
SAO2 % BLDV: 86.2 % (ref 70–75)
SARS-COV-2 RNA RESP QL NAA+PROBE: NEGATIVE
SODIUM SERPL-SCNC: 124 MMOL/L (ref 135–145)
SODIUM SERPL-SCNC: 127 MMOL/L (ref 135–145)
SODIUM UR-SCNC: 130 MMOL/L
SP GR UR STRIP: 1.03 (ref 1–1.03)
SQUAMOUS EPITHELIAL: <1 /HPF
UROBILINOGEN UR STRIP-MCNC: NORMAL MG/DL
WBC # BLD AUTO: 10.6 10E3/UL (ref 4–11)
WBC URINE: 2 /HPF

## 2024-09-13 PROCEDURE — 99223 1ST HOSP IP/OBS HIGH 75: CPT | Mod: FS | Performed by: INTERNAL MEDICINE

## 2024-09-13 PROCEDURE — G0378 HOSPITAL OBSERVATION PER HR: HCPCS

## 2024-09-13 PROCEDURE — 36415 COLL VENOUS BLD VENIPUNCTURE: CPT

## 2024-09-13 PROCEDURE — 96376 TX/PRO/DX INJ SAME DRUG ADON: CPT

## 2024-09-13 PROCEDURE — 84145 PROCALCITONIN (PCT): CPT

## 2024-09-13 PROCEDURE — 250N000011 HC RX IP 250 OP 636

## 2024-09-13 PROCEDURE — 99285 EMERGENCY DEPT VISIT HI MDM: CPT | Mod: 25

## 2024-09-13 PROCEDURE — 96361 HYDRATE IV INFUSION ADD-ON: CPT

## 2024-09-13 PROCEDURE — 250N000013 HC RX MED GY IP 250 OP 250 PS 637

## 2024-09-13 PROCEDURE — 85007 BL SMEAR W/DIFF WBC COUNT: CPT

## 2024-09-13 PROCEDURE — 76705 ECHO EXAM OF ABDOMEN: CPT

## 2024-09-13 PROCEDURE — 82805 BLOOD GASES W/O2 SATURATION: CPT

## 2024-09-13 PROCEDURE — 83690 ASSAY OF LIPASE: CPT

## 2024-09-13 PROCEDURE — 87637 SARSCOV2&INF A&B&RSV AMP PRB: CPT

## 2024-09-13 PROCEDURE — 74176 CT ABD & PELVIS W/O CONTRAST: CPT

## 2024-09-13 PROCEDURE — 99207 PR APP CREDIT; MD BILLING SHARED VISIT: CPT | Mod: FS

## 2024-09-13 PROCEDURE — 81003 URINALYSIS AUTO W/O SCOPE: CPT

## 2024-09-13 PROCEDURE — 82435 ASSAY OF BLOOD CHLORIDE: CPT

## 2024-09-13 PROCEDURE — 83935 ASSAY OF URINE OSMOLALITY: CPT

## 2024-09-13 PROCEDURE — 83735 ASSAY OF MAGNESIUM: CPT

## 2024-09-13 PROCEDURE — 250N000011 HC RX IP 250 OP 636: Performed by: INTERNAL MEDICINE

## 2024-09-13 PROCEDURE — 99285 EMERGENCY DEPT VISIT HI MDM: CPT

## 2024-09-13 PROCEDURE — 83930 ASSAY OF BLOOD OSMOLALITY: CPT

## 2024-09-13 PROCEDURE — 258N000003 HC RX IP 258 OP 636

## 2024-09-13 PROCEDURE — 96374 THER/PROPH/DIAG INJ IV PUSH: CPT

## 2024-09-13 PROCEDURE — 84100 ASSAY OF PHOSPHORUS: CPT

## 2024-09-13 PROCEDURE — 84300 ASSAY OF URINE SODIUM: CPT

## 2024-09-13 PROCEDURE — 85027 COMPLETE CBC AUTOMATED: CPT

## 2024-09-13 RX ORDER — ONDANSETRON 2 MG/ML
4 INJECTION INTRAMUSCULAR; INTRAVENOUS ONCE
Status: DISCONTINUED | OUTPATIENT
Start: 2024-09-13 | End: 2024-09-13

## 2024-09-13 RX ORDER — ACETAMINOPHEN 325 MG/1
650 TABLET ORAL EVERY 4 HOURS PRN
Status: DISCONTINUED | OUTPATIENT
Start: 2024-09-13 | End: 2024-09-13

## 2024-09-13 RX ORDER — POTASSIUM CHLORIDE 7.45 MG/ML
10 INJECTION INTRAVENOUS ONCE
Status: DISCONTINUED | OUTPATIENT
Start: 2024-09-13 | End: 2024-09-13

## 2024-09-13 RX ORDER — ACETAMINOPHEN 650 MG/1
650 SUPPOSITORY RECTAL EVERY 4 HOURS PRN
Status: DISCONTINUED | OUTPATIENT
Start: 2024-09-13 | End: 2024-09-14 | Stop reason: HOSPADM

## 2024-09-13 RX ORDER — ACETAMINOPHEN 650 MG/1
650 SUPPOSITORY RECTAL EVERY 4 HOURS PRN
Status: DISCONTINUED | OUTPATIENT
Start: 2024-09-13 | End: 2024-09-13

## 2024-09-13 RX ORDER — POTASSIUM CHLORIDE 20MEQ/15ML
40 LIQUID (ML) ORAL ONCE
Status: DISCONTINUED | OUTPATIENT
Start: 2024-09-13 | End: 2024-09-13

## 2024-09-13 RX ORDER — LIDOCAINE 40 MG/G
CREAM TOPICAL
Status: DISCONTINUED | OUTPATIENT
Start: 2024-09-13 | End: 2024-09-14 | Stop reason: HOSPADM

## 2024-09-13 RX ORDER — CALCIUM CARBONATE 500 MG/1
1000 TABLET, CHEWABLE ORAL 4 TIMES DAILY PRN
Status: DISCONTINUED | OUTPATIENT
Start: 2024-09-13 | End: 2024-09-14 | Stop reason: HOSPADM

## 2024-09-13 RX ORDER — GUANFACINE 4 MG/1
4 TABLET, EXTENDED RELEASE ORAL
Status: DISCONTINUED | OUTPATIENT
Start: 2024-09-14 | End: 2024-09-14 | Stop reason: HOSPADM

## 2024-09-13 RX ORDER — AMOXICILLIN 250 MG
2 CAPSULE ORAL 2 TIMES DAILY PRN
Status: DISCONTINUED | OUTPATIENT
Start: 2024-09-13 | End: 2024-09-14 | Stop reason: HOSPADM

## 2024-09-13 RX ORDER — ONDANSETRON 2 MG/ML
4 INJECTION INTRAMUSCULAR; INTRAVENOUS EVERY 6 HOURS PRN
Status: DISCONTINUED | OUTPATIENT
Start: 2024-09-13 | End: 2024-09-13

## 2024-09-13 RX ORDER — PROCHLORPERAZINE MALEATE 5 MG
10 TABLET ORAL EVERY 6 HOURS PRN
Status: DISCONTINUED | OUTPATIENT
Start: 2024-09-13 | End: 2024-09-14 | Stop reason: HOSPADM

## 2024-09-13 RX ORDER — ONDANSETRON 2 MG/ML
4 INJECTION INTRAMUSCULAR; INTRAVENOUS EVERY 6 HOURS PRN
Status: DISCONTINUED | OUTPATIENT
Start: 2024-09-13 | End: 2024-09-14 | Stop reason: HOSPADM

## 2024-09-13 RX ORDER — ALBUTEROL SULFATE 90 UG/1
2 AEROSOL, METERED RESPIRATORY (INHALATION) EVERY 6 HOURS PRN
Status: DISCONTINUED | OUTPATIENT
Start: 2024-09-13 | End: 2024-09-14 | Stop reason: HOSPADM

## 2024-09-13 RX ORDER — ACETAMINOPHEN 325 MG/1
650 TABLET ORAL EVERY 4 HOURS PRN
Status: DISCONTINUED | OUTPATIENT
Start: 2024-09-13 | End: 2024-09-14 | Stop reason: HOSPADM

## 2024-09-13 RX ORDER — PROCHLORPERAZINE 25 MG
25 SUPPOSITORY, RECTAL RECTAL EVERY 12 HOURS PRN
Status: DISCONTINUED | OUTPATIENT
Start: 2024-09-13 | End: 2024-09-14 | Stop reason: HOSPADM

## 2024-09-13 RX ORDER — ONDANSETRON 4 MG/1
4 TABLET, ORALLY DISINTEGRATING ORAL EVERY 6 HOURS PRN
Status: DISCONTINUED | OUTPATIENT
Start: 2024-09-13 | End: 2024-09-13

## 2024-09-13 RX ORDER — ONDANSETRON 4 MG/1
4-8 TABLET, ORALLY DISINTEGRATING ORAL EVERY 6 HOURS PRN
Status: DISCONTINUED | OUTPATIENT
Start: 2024-09-13 | End: 2024-09-14 | Stop reason: HOSPADM

## 2024-09-13 RX ORDER — LISDEXAMFETAMINE DIMESYLATE 60 MG/1
60 CAPSULE ORAL DAILY
Status: DISCONTINUED | OUTPATIENT
Start: 2024-09-14 | End: 2024-09-14 | Stop reason: HOSPADM

## 2024-09-13 RX ORDER — AMOXICILLIN 250 MG
1 CAPSULE ORAL 2 TIMES DAILY PRN
Status: DISCONTINUED | OUTPATIENT
Start: 2024-09-13 | End: 2024-09-14 | Stop reason: HOSPADM

## 2024-09-13 RX ADMIN — CALCIUM CARBONATE (ANTACID) CHEW TAB 500 MG 1000 MG: 500 CHEW TAB at 23:20

## 2024-09-13 RX ADMIN — ACETAMINOPHEN 650 MG: 325 TABLET ORAL at 22:29

## 2024-09-13 RX ADMIN — SODIUM CHLORIDE 1000 ML: 9 INJECTION, SOLUTION INTRAVENOUS at 14:08

## 2024-09-13 RX ADMIN — ONDANSETRON 4 MG: 2 INJECTION INTRAMUSCULAR; INTRAVENOUS at 17:24

## 2024-09-13 RX ADMIN — POTASSIUM CHLORIDE 40 MEQ: 40 SOLUTION ORAL at 16:30

## 2024-09-13 RX ADMIN — ONDANSETRON 4 MG: 2 INJECTION INTRAMUSCULAR; INTRAVENOUS at 22:30

## 2024-09-13 ASSESSMENT — ACTIVITIES OF DAILY LIVING (ADL)
ADLS_ACUITY_SCORE: 35
ADLS_ACUITY_SCORE: 33
ADLS_ACUITY_SCORE: 35
ADLS_ACUITY_SCORE: 33
ADLS_ACUITY_SCORE: 35
ADLS_ACUITY_SCORE: 35

## 2024-09-13 ASSESSMENT — COLUMBIA-SUICIDE SEVERITY RATING SCALE - C-SSRS
2. HAVE YOU ACTUALLY HAD ANY THOUGHTS OF KILLING YOURSELF IN THE PAST MONTH?: NO
6. HAVE YOU EVER DONE ANYTHING, STARTED TO DO ANYTHING, OR PREPARED TO DO ANYTHING TO END YOUR LIFE?: NO
1. IN THE PAST MONTH, HAVE YOU WISHED YOU WERE DEAD OR WISHED YOU COULD GO TO SLEEP AND NOT WAKE UP?: NO

## 2024-09-13 NOTE — ED PROVIDER NOTES
ED Provider Note  [facility]      History     Chief Complaint   Patient presents with    Nausea & Vomiting     The history is provided by the patient and medical records. No  was used.     Zoran Gonzáles is a 28 year old male with a history of polysubstance abuse, electrolyte abnormalities, MASHA, asthma, ADHD, adjustment disorder, major depression, oppositional defiant disorder, seizures, fetal alcohol syndrome who presents to the emergency department via EMS with nausea and vomiting for the past 3 days.  He reports approximately 16 episodes of emesis in the past 24 hours.  He denies any hematemesis or coffee-ground appearance.  He states that his emesis appears to consist of his oral intake that he trials without any bilious appearance.  He denies any associated abdominal pain, cramping, or distention.  He denies fevers or chills.  He states his last bowel movement was 2 days ago and was normal for him at that time.  He states the last time he passed flatus was 1 to 2 days ago.  He denies any dysuria, hematuria, urgency or frequency of urination, or penile discharge.  He denies any URI symptoms.  He denies chest pain or shortness of air.  He denies alcohol use.  He reports marijuana use with his last use approximately 2 to 3 days ago but denies any illicit drug use otherwise.  He denies history of abdominal surgeries.  He was given 4 mg  PO Zofran by EMS en route.    Past Medical History  Past Medical History:   Diagnosis Date    ADHD (attention deficit hyperactivity disorder)     Asthma     ODD (oppositional defiant disorder)      No past surgical history on file.  No current outpatient medications on file.    No Known Allergies  Family History  Family History   Problem Relation Age of Onset    Substance Abuse Mother     Bipolar Disorder Mother     Depression Mother     Anxiety Disorder Mother     Substance Abuse Father     Substance Abuse Maternal Grandmother      Substance Abuse Maternal Grandfather     Depression Sister     Anxiety Disorder Sister     Mental Illness Sister     Diabetes Mother     Diabetes Sister     Diabetes Maternal Uncle      Social History   Social History     Tobacco Use    Smoking status: Every Day     Current packs/day: 0.10     Average packs/day: 0.1 packs/day for 2.0 years (0.2 ttl pk-yrs)     Types: Cigarettes    Smokeless tobacco: Never   Substance Use Topics    Alcohol use: Yes     Comment: 2x monthly    Drug use: Yes     Types: Marijuana     Comment: THC-last use 9/5/2012      Past medical history, past surgical history, medications, allergies, family history, and social history were reviewed with the patient. No additional pertinent items.     A medically appropriate review of systems was performed with pertinent positives and negatives noted in the HPI, and all other systems negative.    Physical Exam   BP: 121/70  Pulse: 100  Temp: 98.7  F (37.1  C)  Resp: 18  Weight: 81.6 kg (180 lb)  SpO2: 99 %    Vitals:    09/13/24 1259 09/13/24 2108   BP: 121/70    Pulse: 100 92   Resp: 18 18   Temp: 98.7  F (37.1  C) (!) 101.4  F (38.6  C)   TempSrc:  Oral   SpO2: 99%    Weight: 81.6 kg (180 lb)       Physical Exam  Constitutional:       General: He is not in acute distress.     Appearance: Normal appearance. He is normal weight. He is not ill-appearing, toxic-appearing or diaphoretic.   Cardiovascular:      Rate and Rhythm: Regular rhythm. Tachycardia present.   Pulmonary:      Effort: Pulmonary effort is normal. No respiratory distress.      Breath sounds: Normal breath sounds.   Abdominal:      General: Abdomen is flat. Bowel sounds are increased. There is no distension.      Palpations: Abdomen is soft.      Tenderness: There is no abdominal tenderness. There is no guarding or rebound. Negative signs include Dumont's sign, Rovsing's sign and McBurney's sign.   Skin:     General: Skin is warm.      Capillary Refill: Capillary refill takes less than 2  seconds.      Findings: No erythema or rash.   Neurological:      General: No focal deficit present.      Mental Status: He is alert. Mental status is at baseline.   Psychiatric:         Mood and Affect: Mood normal.         Behavior: Behavior normal.           ED Course, Procedures, & Data     ED Course as of 09/13/24 2128   Fri Sep 13, 2024   1454 Sodium(!): 124   1454 Potassium(!): 2.9   1454 Creatinine(!): 1.27   1458 Bilirubin Total(!): 2.5   1902 Triage hospitalist paged     Procedures              Results for orders placed or performed during the hospital encounter of 09/13/24   US Abdomen Limited     Status: None    Narrative    US ABDOMEN LIMITED 9/13/2024 4:10 PM    CLINICAL HISTORY: Nausea/vomiting; elevated total bilirubin.    TECHNIQUE: Limited abdominal ultrasound.    COMPARISON: None.    FINDINGS:    GALLBLADDER: Negative sonographic Dumont's sign. Gallbladder sludge  identified. No gallbladder wall thickening.    BILE DUCTS: There is no biliary dilatation. The common duct measures  4mm.    LIVER: Echogenic region at the left liver is 3.4 cm.    RIGHT KIDNEY: Prominent apparent calcification is suggested at the  right kidney measuring approximately 14 mm. This could be a prominent  stone. Associated cystic structure is 2.7 cm.    PANCREAS: The visualized portions of the pancreas are normal.    No ascites.      Impression    IMPRESSION:  1.  Gallbladder sludge. Negative sonographic Dumont's sign. No biliary  ductal dilatation.  2.  Focal echogenic structure within the left liver could be focal  fatty deposition but a hepatic lesion is possible. This may be further  evaluated with nonurgent MRI.  3.  Prominent apparent calcification at the right kidney is  approximately 14 mm. This could be a prominent intrarenal stone versus  a calcification associated with a cyst that is in this area. This may  be further evaluated with CT.     KIRA PARRA MD         SYSTEM ID:  [id number]   CT Abdomen Pelvis w/o  Contrast     Status: None    Narrative    EXAM: CT ABDOMEN PELVIS W/O CONTRAST  LOCATION: [facility]  DATE: 9/13/2024    INDICATION: Indeterminate right renal calcification on same day ultrasound; elevated total bilirubin.  COMPARISON: Same day right upper quadrant ultrasound; CT abdomen/pelvis 7/11/2024.  TECHNIQUE: CT scan of the abdomen and pelvis was performed without IV contrast. Multiplanar reformats were obtained. Dose reduction techniques were used.  CONTRAST: None.    FINDINGS:      Absence of intravenous contrast limits the sensitivity of this examination for detection of infectious/inflammatory change, post traumatic abnormalities, vascular abnormalities, and visceral lesions.    LOWER CHEST: Mild thick-walled distal esophagus; correlate for a distal esophagitis.    HEPATOBILIARY: Liver is normal in attenuation and size.    Gallbladder is normal.    No intrahepatic or intrahepatic biliary ductal dilatation.    PANCREAS: Normal attenuation. No peripancreatic inflammatory fat stranding.    SPLEEN: Normal attenuation. Normal size.    ADRENAL GLANDS: Normal.    KIDNEYS: There is a 2.5 cm cyst of the mid zone right kidney contains that thin layering calcification, similar to previous CT and corresponding to abnormality on same day ultrasound. Layering calcium is stable from 5/1/2024, though new from 9/1/2021.    No nephroureterolithiasis.    Urinary bladder is unremarkable.    PELVIC ORGANS: No suspicious abnormality.    BOWEL: No evidence of acute gastrointestinal inflammation or obstruction.    No intraperitoneal free fluid or free air.    LYMPH NODES: No suspicious abdominal or pelvic lymphadenopathy.    VASCULATURE: No abdominal aortic aneurysm.    MUSCULOSKELETAL: No suspicious abnormality.    OTHER: No additionally significant abnormalities.      Impression    IMPRESSION:   1.  No acute CT abnormality of the abdomen/pelvis, within limitation of the  noncontrast technique.  2.  Minimally complex right renal cyst, containing layering internal calcification and corresponding to abnormality on same day sonography. No further follow up recommended  3.  Mild thick-walled distal esophagus; correlate for a distal esophagitis.     Comprehensive metabolic panel     Status: Abnormal   Result Value Ref Range    Sodium 124 (L) 135 - 145 mmol/L    Potassium 2.9 (L) 3.4 - 5.3 mmol/L    Carbon Dioxide (CO2) 36 (H) 22 - 29 mmol/L    Anion Gap 11 7 - 15 mmol/L    Urea Nitrogen 17.5 6.0 - 20.0 mg/dL    Creatinine 1.27 (H) 0.67 - 1.17 mg/dL    GFR Estimate 79 >60 mL/min/1.73m2    Calcium 9.7 8.8 - 10.4 mg/dL    Chloride 77 (L) 98 - 107 mmol/L    Glucose 105 (H) 70 - 99 mg/dL    Alkaline Phosphatase 103 40 - 150 U/L    AST 33 0 - 45 U/L    ALT 18 0 - 70 U/L    Protein Total 8.4 (H) 6.4 - 8.3 g/dL    Albumin 4.8 3.5 - 5.2 g/dL    Bilirubin Total 2.5 (H) <=1.2 mg/dL   Lipase     Status: Normal   Result Value Ref Range    Lipase 19 13 - 60 U/L   Magnesium     Status: Normal   Result Value Ref Range    Magnesium 2.0 1.7 - 2.3 mg/dL   UA with Microscopic reflex to Culture     Status: Abnormal    Specimen: Urine, NOS   Result Value Ref Range    Color Urine Yellow Colorless, Straw, Light Yellow, Yellow    Appearance Urine Clear Clear    Glucose Urine Negative Negative mg/dL    Bilirubin Urine Negative Negative    Ketones Urine 20 (A) Negative mg/dL    Specific Gravity Urine 1.026 1.003 - 1.035    Blood Urine Negative Negative    pH Urine 8.5 (H) 5.0 - 7.0    Protein Albumin Urine 50 (A) Negative mg/dL    Urobilinogen Urine Normal Normal, 2.0 mg/dL    Nitrite Urine Negative Negative    Leukocyte Esterase Urine Negative Negative    Mucus Urine Present (A) None Seen /LPF    RBC Urine 1 <=2 /HPF    WBC Urine 2 <=5 /HPF    Squamous Epithelials Urine <1 <=1 /HPF    Narrative    Urine Culture not indicated   Symptomatic Influenza A/B, RSV, & SARS-CoV2 PCR (COVID-19) Nasopharyngeal     Status:  Normal    Specimen: Nasopharyngeal; Swab   Result Value Ref Range    Influenza A PCR Negative Negative    Influenza B PCR Negative Negative    RSV PCR Negative Negative    SARS CoV2 PCR Negative Negative    Narrative    Testing was performed using the Xpert Xpress CoV2/Flu/RSV Assay on the Cepheid GeneXpert Instrument. This test should be ordered for the detection of SARS-CoV-2, influenza, and RSV viruses in individuals who meet clinical and/or epidemiological criteria. Test performance is unknown in asymptomatic patients. This test is for in vitro diagnostic use under the FDA EUA for laboratories certified under CLIA to perform high or moderate complexity testing. This test has not been FDA cleared or approved. A negative result does not rule out the presence of PCR inhibitors in the specimen or target RNA in concentration below the limit of detection for the assay. If only one viral target is positive but coinfection with multiple targets is suspected, the sample should be re-tested with another FDA cleared, approved, or authorized test, if coinfection would change clinical management. This test was validated by the [facility]. These laboratories are certified under the Clinical Laboratory Improvement Amendments of 1988 (CLIA-88) as qualified to perform high complexity laboratory testing.   CBC with platelets and differential     Status: Abnormal   Result Value Ref Range    WBC Count 10.6 4.0 - 11.0 10e3/uL    RBC Count 6.11 (H) 4.40 - 5.90 10e6/uL    Hemoglobin 16.3 13.3 - 17.7 g/dL    Hematocrit 47.7 40.0 - 53.0 %    MCV 78 78 - 100 fL    MCH 26.7 26.5 - 33.0 pg    MCHC 34.2 31.5 - 36.5 g/dL    RDW 13.1 10.0 - 15.0 %    Platelet Count 344 150 - 450 10e3/uL    % Neutrophils      % Lymphocytes      % Monocytes      % Eosinophils      % Basophils      % Immature Granulocytes      NRBCs per 100 WBC 0 <1 /100    Absolute Neutrophils      Absolute Lymphocytes      Absolute Monocytes      Absolute  Eosinophils      Absolute Basophils      Absolute Immature Granulocytes      Absolute NRBCs 0.0 10e3/uL   Manual Differential     Status: None   Result Value Ref Range    % Neutrophils 74 %    % Lymphocytes 16 %    % Monocytes 10 %    % Eosinophils 0 %    % Basophils 0 %    Absolute Neutrophils 7.8 1.6 - 8.3 10e3/uL    Absolute Lymphocytes 1.7 0.8 - 5.3 10e3/uL    Absolute Monocytes 1.1 0.0 - 1.3 10e3/uL    Absolute Eosinophils 0.0 0.0 - 0.7 10e3/uL    Absolute Basophils 0.0 0.0 - 0.2 10e3/uL    RBC Morphology Confirmed RBC Indices     Platelet Assessment  Automated Count Confirmed. Platelet morphology is normal.     Automated Count Confirmed. Platelet morphology is normal.   Osmolality urine     Status: Normal   Result Value Ref Range    Osmolality Urine 897 100 - 1,200 mmol/kg    Narrative    Reference Ranges depend on patient's hydration status and renal function.   Neonates:  mmol/kg   2 years and older, random specimens: 100-1200 mmol/kg; Greater than 850 mmol/kg after 12 hour fluid restriction  Urine/serum osmolality ratio: 2 years and older: 1.0-3.0; 3.0-4.7 after 12 hour fluid restriction   Sodium random urine     Status: None   Result Value Ref Range    Sodium Urine mmol/L 130 mmol/L   CBC with platelets differential     Status: Abnormal    Narrative    The following orders were created for panel order CBC with platelets differential.  Procedure                               Abnormality         Status                     ---------                               -----------         ------                     CBC with platelets and d...[[id number]]  Abnormal            Final result               Manual Differential[[id number]]                              Final result                 Please view results for these tests on the individual orders.     Medications   sodium chloride 0.9% BOLUS 1,000 mL (0 mLs Intravenous Stopped 9/13/24 1631)   potassium chloride (KAYCIEL) solution 40 mEq (40 mEq Oral $Given  9/13/24 1630)   ondansetron (ZOFRAN) injection 4 mg (4 mg Intravenous $Given 9/13/24 1720)     Labs Ordered and Resulted from Time of ED Arrival to Time of ED Departure   COMPREHENSIVE METABOLIC PANEL - Abnormal       Result Value    Sodium 124 (*)     Potassium 2.9 (*)     Carbon Dioxide (CO2) 36 (*)     Anion Gap 11      Urea Nitrogen 17.5      Creatinine 1.27 (*)     GFR Estimate 79      Calcium 9.7      Chloride 77 (*)     Glucose 105 (*)     Alkaline Phosphatase 103      AST 33      ALT 18      Protein Total 8.4 (*)     Albumin 4.8      Bilirubin Total 2.5 (*)    ROUTINE UA WITH MICROSCOPIC REFLEX TO CULTURE - Abnormal    Color Urine Yellow      Appearance Urine Clear      Glucose Urine Negative      Bilirubin Urine Negative      Ketones Urine 20 (*)     Specific Gravity Urine 1.026      Blood Urine Negative      pH Urine 8.5 (*)     Protein Albumin Urine 50 (*)     Urobilinogen Urine Normal      Nitrite Urine Negative      Leukocyte Esterase Urine Negative      Mucus Urine Present (*)     RBC Urine 1      WBC Urine 2      Squamous Epithelials Urine <1     CBC WITH PLATELETS AND DIFFERENTIAL - Abnormal    WBC Count 10.6      RBC Count 6.11 (*)     Hemoglobin 16.3      Hematocrit 47.7      MCV 78      MCH 26.7      MCHC 34.2      RDW 13.1      Platelet Count 344      % Neutrophils        % Lymphocytes        % Monocytes        % Eosinophils        % Basophils        % Immature Granulocytes        NRBCs per 100 WBC 0      Absolute Neutrophils        Absolute Lymphocytes        Absolute Monocytes        Absolute Eosinophils        Absolute Basophils        Absolute Immature Granulocytes        Absolute NRBCs 0.0     LIPASE - Normal    Lipase 19     MAGNESIUM - Normal    Magnesium 2.0     INFLUENZA A/B, RSV, & SARS-COV2 PCR - Normal    Influenza A PCR Negative      Influenza B PCR Negative      RSV PCR Negative      SARS CoV2 PCR Negative     OSMOLALITY, RANDOM URINE - Normal    Osmolality Urine 897      DIFFERENTIAL    % Neutrophils 74      % Lymphocytes 16      % Monocytes 10      % Eosinophils 0      % Basophils 0      Absolute Neutrophils 7.8      Absolute Lymphocytes 1.7      Absolute Monocytes 1.1      Absolute Eosinophils 0.0      Absolute Basophils 0.0      RBC Morphology Confirmed RBC Indices      Platelet Assessment        Value: Automated Count Confirmed. Platelet morphology is normal.   SODIUM RANDOM URINE    Sodium Urine mmol/L 130     OSMOLALITY   BLOOD GAS VENOUS   BASIC METABOLIC PANEL     CT Abdomen Pelvis w/o Contrast   Final Result   IMPRESSION:    1.  No acute CT abnormality of the abdomen/pelvis, within limitation of the noncontrast technique.   2.  Minimally complex right renal cyst, containing layering internal calcification and corresponding to abnormality on same day sonography. No further follow up recommended   3.  Mild thick-walled distal esophagus; correlate for a distal esophagitis.         US Abdomen Limited   Final Result   IMPRESSION:   1.  Gallbladder sludge. Negative sonographic Dumont's sign. No biliary   ductal dilatation.   2.  Focal echogenic structure within the left liver could be focal   fatty deposition but a hepatic lesion is possible. This may be further   evaluated with nonurgent MRI.   3.  Prominent apparent calcification at the right kidney is   approximately 14 mm. This could be a prominent intrarenal stone versus   a calcification associated with a cyst that is in this area. This may   be further evaluated with CT.       KIRA PARRA MD            SYSTEM ID:  [id number]             Critical care was not performed.     Medical Decision Making  The patient's presentation was of high complexity (an acute health issue posing potential threat to life or bodily function).    The patient's evaluation involved:  review of external note(s) from 1 sources (Previous ED encounters at numerous ED's 05/25/24, 07/11/24, 07/22/24, 08/20/24)  review of 1 test result(s) ordered prior  to this encounter (CT abdomen and pelvis 07/11/24, 05/01/24)  ordering and/or review of 3+ test(s) in this encounter (see separate area of note for details)    The patient's management necessitated moderate risk (prescription drug management including medications given in the ED), moderate risk (IV contrast administration), high risk (drug therapy requiring intensive monitoring (Potassium)), and high risk (a decision regarding hospitalization).      Assessment & Plan    Zoran is a 28-year-old male that presented to the ED by EMS for symptoms of intractable nausea and vomiting x 3 days.  Acceptable vital signs with only mild elevated heart rate at 100 but otherwise afebrile and normotensive.  Patient in no acute distress and nontoxic-appearing otherwise.  No acute abdomen signs on exam including guarding, rebound, rigidity of the abdomen otherwise soft without distention.  COVID, influenza, RSV negative.  UA notable for 20 ketones but otherwise negative for signs of infection.  Hyponatremia at 124 and hypokalemia 2.9 as well as mild MASHA with creatinine at 1.27 with baseline previous at 0.9 on 7/22/2024.  Bilirubin elevated at 2.5 with most recent previous at 0.7 on 5/25/2024.  Ultrasound right upper quadrant limited abdomen ordered for evaluation of the area of the liver with new total bilirubin elevation.  Ultrasound notable for gallbladder sludge without sonographic Dumont sign or biliary ductal dilatation with a focal echogenic structure within the left liver could be focal fatty deposition but hepatic lesion is possible and lastly a prominent apparent calcification of the right kidney approximately 14 mm that could be a prominent into a renal stone versus calcification with a cyst that is in the area.  CT abdomen and pelvis without contrast obtained for further evaluation of this calcified area and notable for a minimally complex right renal cyst containing layering internal calcification corresponding to  abnormality on same day ultrasound and present on previous CT scans including on 5/1/2024.  IV NS bolus, IV Zofran, and p.o. potassium administered in the ED for symptoms and electrolyte abnormalities.  Suspect some component of cannabinoid hyperemesis as etiology of patient's intractable nausea vomiting over the past 3 days leading to his electrolyte abnormalities ultimately leading to recommendation for admission at this time.  Discussed lab and imaging results with the patient at length as well as the recommendation for admission for which she voiced understanding and agreeability.  Discussed patient case with triage hospitalist was agreeable to the admission for observation for said diagnosis above.  Otherwise all questions were answered prior to transfer of care to the general medicine service.    --    ED Attending Physician Attestation    I Nhan Justice DO, cared for this patient with the Advanced Practice Provider (SHILA). I personally provided a substantive portion of the care for this patient, including approving the care plan for the number and complexity of problems addressed and taking responsibility related to the risk of complications and/or morbidity or mortality of patient management. Please see the SHILA's documentation for full details.          Nhan Justice DO  Emergency Medicine      I have reviewed the nursing notes. I have reviewed the findings, diagnosis, plan and need for follow up with the patient.    Current Discharge Medication List          Final diagnoses:   Hyponatremia   Hypokalemia   Cannabinoid hyperemesis syndrome   Nausea and vomiting       Tg Flowers PA-C    [facility] EMERGENCY DEPARTMENT  9/13/2024     Tg Flowers PA-C  09/13/24 [id number]       Nhan Justice DO  09/19/24 [id number]

## 2024-09-13 NOTE — ED NOTES
Bed: [facility]  Expected date:   Expected time:   Means of arrival:   Comments:  Cande [address] 28 yr M Vomiting

## 2024-09-14 ENCOUNTER — APPOINTMENT (OUTPATIENT)
Dept: GENERAL RADIOLOGY | Facility: CLINIC | Age: 29
End: 2024-09-14
Attending: INTERNAL MEDICINE
Payer: COMMERCIAL

## 2024-09-14 VITALS
OXYGEN SATURATION: 100 % | TEMPERATURE: 98.6 F | SYSTOLIC BLOOD PRESSURE: 139 MMHG | RESPIRATION RATE: 16 BRPM | WEIGHT: 180 LBS | DIASTOLIC BLOOD PRESSURE: 92 MMHG | HEART RATE: 78 BPM | BODY MASS INDEX: 25.83 KG/M2

## 2024-09-14 LAB
ALBUMIN SERPL BCG-MCNC: 4.2 G/DL (ref 3.5–5.2)
ALP SERPL-CCNC: 91 U/L (ref 40–150)
ALT SERPL W P-5'-P-CCNC: 16 U/L (ref 0–70)
ANION GAP SERPL CALCULATED.3IONS-SCNC: 12 MMOL/L (ref 7–15)
AST SERPL W P-5'-P-CCNC: 26 U/L (ref 0–45)
BILIRUB SERPL-MCNC: 2.4 MG/DL
BUN SERPL-MCNC: 13.5 MG/DL (ref 6–20)
CALCIUM SERPL-MCNC: 9.2 MG/DL (ref 8.8–10.4)
CHLORIDE SERPL-SCNC: 86 MMOL/L (ref 98–107)
CREAT SERPL-MCNC: 1.11 MG/DL (ref 0.67–1.17)
EGFRCR SERPLBLD CKD-EPI 2021: >90 ML/MIN/1.73M2
ERYTHROCYTE [DISTWIDTH] IN BLOOD BY AUTOMATED COUNT: 13 % (ref 10–15)
GLUCOSE SERPL-MCNC: 113 MG/DL (ref 70–99)
HCO3 SERPL-SCNC: 31 MMOL/L (ref 22–29)
HCT VFR BLD AUTO: 43.9 % (ref 40–53)
HGB BLD-MCNC: 14.7 G/DL (ref 13.3–17.7)
MAGNESIUM SERPL-MCNC: 1.9 MG/DL (ref 1.7–2.3)
MCH RBC QN AUTO: 26.2 PG (ref 26.5–33)
MCHC RBC AUTO-ENTMCNC: 33.5 G/DL (ref 31.5–36.5)
MCV RBC AUTO: 78 FL (ref 78–100)
PHOSPHATE SERPL-MCNC: 2.9 MG/DL (ref 2.5–4.5)
PLATELET # BLD AUTO: 284 10E3/UL (ref 150–450)
POTASSIUM SERPL-SCNC: 3.2 MMOL/L (ref 3.4–5.3)
PROT SERPL-MCNC: 7.2 G/DL (ref 6.4–8.3)
RBC # BLD AUTO: 5.61 10E6/UL (ref 4.4–5.9)
SODIUM SERPL-SCNC: 129 MMOL/L (ref 135–145)
SODIUM SERPL-SCNC: 129 MMOL/L (ref 135–145)
WBC # BLD AUTO: 10.2 10E3/UL (ref 4–11)

## 2024-09-14 PROCEDURE — 36415 COLL VENOUS BLD VENIPUNCTURE: CPT

## 2024-09-14 PROCEDURE — 85027 COMPLETE CBC AUTOMATED: CPT

## 2024-09-14 PROCEDURE — 83735 ASSAY OF MAGNESIUM: CPT

## 2024-09-14 PROCEDURE — 84100 ASSAY OF PHOSPHORUS: CPT

## 2024-09-14 PROCEDURE — 71046 X-RAY EXAM CHEST 2 VIEWS: CPT | Mod: 26 | Performed by: RADIOLOGY

## 2024-09-14 PROCEDURE — 250N000013 HC RX MED GY IP 250 OP 250 PS 637: Performed by: INTERNAL MEDICINE

## 2024-09-14 PROCEDURE — 71046 X-RAY EXAM CHEST 2 VIEWS: CPT

## 2024-09-14 PROCEDURE — 84295 ASSAY OF SERUM SODIUM: CPT

## 2024-09-14 PROCEDURE — G0378 HOSPITAL OBSERVATION PER HR: HCPCS

## 2024-09-14 PROCEDURE — 93005 ELECTROCARDIOGRAM TRACING: CPT

## 2024-09-14 PROCEDURE — 93010 ELECTROCARDIOGRAM REPORT: CPT | Performed by: INTERNAL MEDICINE

## 2024-09-14 RX ADMIN — Medication 1 MG: at 04:01

## 2024-09-14 ASSESSMENT — ACTIVITIES OF DAILY LIVING (ADL)
ADLS_ACUITY_SCORE: 35

## 2024-09-14 NOTE — ED NOTES
Report to Floor nurse provided over the phone. Pt A&O, IND, no N/V/D at this time. PO K administered earlier in shift. Pt has morning labs ordered. No additional questions about pt from nurse. Tranfers request being placed.

## 2024-09-14 NOTE — DISCHARGE SUMMARY
"[facility]  Hospitalist Discharge Summary      Date of Admission:  9/13/2024  Date of Discharge:  9/14/2024 12:14 PM  Discharging Provider: Cierra Weaver MD  Discharge Service: Hospitalist Service, GOLD TEAM 22    Discharge Diagnoses   Intractable nausea and vomiting  Spectated cannabinoid hyperemesis syndrome  Hyponatremia  Hypokalemia  Metabolic alkalosis  Hyperbilirubinemia  ST elevations on EKG    Clinically Significant Risk Factors     # Overweight: Estimated body mass index is 25.83 kg/m  as calculated from the following:    Height as of 8/20/24: 1.778 m (5' 10\").    Weight as of this encounter: 81.6 kg (180 lb).       Follow-ups Needed After Discharge       Unresulted Labs Ordered in the Past 30 Days of this Admission       No orders found for last 31 day(s).        These results will be followed up by N/A    Discharge Disposition   Patient left the unit without being seen    Hospital Course   Zoran Gonzáles is a 28 year old man with history of polysubstance use disorder, depression, ADHD oppositional defiant disorder, and frequent admissions and ED visits for vomiting suspected to be related to cannabinoid hyperemesis who presented on 9/13 with intractable vomiting.  He was managed with antiemetics in the emergency department and admitted due to multiple abnormal lab values for further management.    Due to abnormal lab values including electrolytes and elevated bilirubin the patient underwent abdominal ultrasound and CT abdomen/pelvis in the ED.  The ultrasound did reveal biliary sludge, but no evidence of cholecystitis.  There was no acute abnormality noted on the abdominal CT.  He was admitted for plans to manage electrolyte abnormalities were suspected to be related to his vomiting.    On the morning of 9/14 the patient was taken off the unit around 8 AM to go to x-ray and had this image completed.  He was seen returning to the unit, but then left his " "room without notifying staff.  Bedside RN did attempt to find him downstairs in the hospital and tried to reach him by phone, but he was not found and was presumed to have left the hospital.  He was discharged per hospital policy after being off the unit for over 2 hours.    Of note, the patient had an EKG done on the evening of admission that was read as \"STEMI.\"  Repeat EKG on the morning of 9/14 with similar and had the same read.  Per notes and discussion with bedside RN who had seen him prior to him leaving there was no known reports of him having any chest pain throughout his stay here.  I did discuss the EKG finding with cardiology who reviewed the EKG.  They suspect that it is less likely the ST elevations are related to acute MI and note that there are differential includes early repolarization versus pericarditis versus other.  They were willing to see the patient, but I let them know that he had left the hospital without notifying anyone so consult was unable to be completed.    I attempted to call the patient, but the phone number listed for him was not in service.      Consultations This Hospital Stay   None    Code Status   Prior    Time Spent on this Encounter   I, Cierra Weaver MD, discharged this patient today but I did not personally see the patient today and will not be billing for the patient's discharge.       Cierra Weaver MD  [facility] [address]  [facility] [address]  Phone: [phone]  Fax: [phone]  ______________________________________________________________________    Physical Exam   Vital Signs: Temp: 98.6  F (37  C) Temp src: Oral BP: (!) 139/92 Pulse: 78   Resp: 16 SpO2: 100 % O2 Device: None (Room air)    Weight: 180 lbs 0 oz  I was unable to examine the patient as he left the unit without notifying staff       Primary Care Physician   Physician No Ref-Primary    Discharge Orders   No discharge procedures on file.    Significant Results and Procedures "   Most Recent 3 CBC's:  Recent Labs   Lab Test 09/14/24  0710 09/13/24  1408 11/22/22  0446   WBC 10.2 10.6 11.0   HGB 14.7 16.3 11.8*   MCV 78 78 77*    344 298     Most Recent 3 BMP's:  Recent Labs   Lab Test 09/14/24  0710 09/14/24  0025 09/13/24  2208 09/13/24  1408   * 129* 127* 124*   POTASSIUM 3.2*  --  3.2* 2.9*   CHLORIDE 86*  --  85* 77*   CO2 31*  --  27 36*   BUN 13.5  --  14.7 17.5   CR 1.11  --  1.10 1.27*   ANIONGAP 12  --  15 11   KEO 9.2  --  9.0 9.7   *  --  113* 105*   ,   Results for orders placed or performed during the hospital encounter of 09/13/24   US Abdomen Limited    Narrative    US ABDOMEN LIMITED 9/13/2024 4:10 PM    CLINICAL HISTORY: Nausea/vomiting; elevated total bilirubin.    TECHNIQUE: Limited abdominal ultrasound.    COMPARISON: None.    FINDINGS:    GALLBLADDER: Negative sonographic Dumont's sign. Gallbladder sludge  identified. No gallbladder wall thickening.    BILE DUCTS: There is no biliary dilatation. The common duct measures  4mm.    LIVER: Echogenic region at the left liver is 3.4 cm.    RIGHT KIDNEY: Prominent apparent calcification is suggested at the  right kidney measuring approximately 14 mm. This could be a prominent  stone. Associated cystic structure is 2.7 cm.    PANCREAS: The visualized portions of the pancreas are normal.    No ascites.      Impression    IMPRESSION:  1.  Gallbladder sludge. Negative sonographic Dumont's sign. No biliary  ductal dilatation.  2.  Focal echogenic structure within the left liver could be focal  fatty deposition but a hepatic lesion is possible. This may be further  evaluated with nonurgent MRI.  3.  Prominent apparent calcification at the right kidney is  approximately 14 mm. This could be a prominent intrarenal stone versus  a calcification associated with a cyst that is in this area. This may  be further evaluated with CT.     KIRA PARRA MD         SYSTEM ID:  [id number]   CT Abdomen Pelvis w/o Contrast     Narrative    EXAM: CT ABDOMEN PELVIS W/O CONTRAST  LOCATION: [facility]  DATE: 9/13/2024    INDICATION: Indeterminate right renal calcification on same day ultrasound; elevated total bilirubin.  COMPARISON: Same day right upper quadrant ultrasound; CT abdomen/pelvis 7/11/2024.  TECHNIQUE: CT scan of the abdomen and pelvis was performed without IV contrast. Multiplanar reformats were obtained. Dose reduction techniques were used.  CONTRAST: None.    FINDINGS:      Absence of intravenous contrast limits the sensitivity of this examination for detection of infectious/inflammatory change, post traumatic abnormalities, vascular abnormalities, and visceral lesions.    LOWER CHEST: Mild thick-walled distal esophagus; correlate for a distal esophagitis.    HEPATOBILIARY: Liver is normal in attenuation and size.    Gallbladder is normal.    No intrahepatic or intrahepatic biliary ductal dilatation.    PANCREAS: Normal attenuation. No peripancreatic inflammatory fat stranding.    SPLEEN: Normal attenuation. Normal size.    ADRENAL GLANDS: Normal.    KIDNEYS: There is a 2.5 cm cyst of the mid zone right kidney contains that thin layering calcification, similar to previous CT and corresponding to abnormality on same day ultrasound. Layering calcium is stable from 5/1/2024, though new from 9/1/2021.    No nephroureterolithiasis.    Urinary bladder is unremarkable.    PELVIC ORGANS: No suspicious abnormality.    BOWEL: No evidence of acute gastrointestinal inflammation or obstruction.    No intraperitoneal free fluid or free air.    LYMPH NODES: No suspicious abdominal or pelvic lymphadenopathy.    VASCULATURE: No abdominal aortic aneurysm.    MUSCULOSKELETAL: No suspicious abnormality.    OTHER: No additionally significant abnormalities.      Impression    IMPRESSION:   1.  No acute CT abnormality of the abdomen/pelvis, within limitation of the noncontrast technique.  2.  Minimally  complex right renal cyst, containing layering internal calcification and corresponding to abnormality on same day sonography. No further follow up recommended  3.  Mild thick-walled distal esophagus; correlate for a distal esophagitis.     XR Chest 2 Views    Narrative    EXAM: XR CHEST 2 VIEWS 9/14/2024 8:22 AM    INDICATION: cough    COMPARISON: Chest radiograph 11/21/2022    TECHNIQUE: Upright PA and lateral views of the chest.    FINDINGS:   Trachea is midline.  Cardiac silhouette is normal in size. Streaky  opacities in the lung bases bilaterally. Mild tenting of the  hemidiaphragms. No pleural effusion or pneumothorax.  Bones and soft  tissues are unremarkable.      Impression    IMPRESSION:   Streaky opacities in the lung bases bilaterally favored to represent  atelectasis. Unable to entirely exclude atypical multifocal pneumonia.    I have personally reviewed the examination and initial interpretation  and I agree with the findings.    VINCENZO LAU MD         SYSTEM ID:  [id number]       Discharge Medications   Discharge Medication List as of 9/14/2024 12:15 PM        CONTINUE these medications which have NOT CHANGED    Details   albuterol (PROVENTIL HFA: VENTOLIN HFA) 108 (90 BASE) MCG/ACT inhaler Inhale 2 puffs into the lungs every 6 hours as needed for shortness of breath / dyspnea, Historical      guanFACINE HCl (INTUNIV) 4 MG TB24 Take 4 mg by mouth daily (with breakfast)., Historical      Lisdexamfetamine Dimesylate (VYVANSE PO) Take 60 mg by mouth daily., 60 mg, Oral, DAILY, Until Discontinued, Historical           Allergies   No Known Allergies

## 2024-09-14 NOTE — H&P
[facility]    History and Physical - Hospitalist Service, GOLD TEAM        Date of Admission:  9/13/2024    Assessment & Plan      Zoran Gonzáles is a 28 year old male admitted on 9/13/2024.  Medical history notable for polysubstance abuse, acute kidney injury, seizure, depression, asthma, ADHD, oppositional defiant disorder, fetal alcohol syndrome.  Presented to emergency department for concern of vomiting.    #Intractable vomiting  #Possible cannabinoid hyperemesis syndrome  # Possible infection  Multiple past presentations for hyperemesis.  Reported to emergency room on 9/13 for vomiting x 3 days.  Upon arrival had reported 16 episodes of emesis in the past 24 hours.  Denied hematemesis/coffee-ground appearance.  Review of systems grossly unremarkable.  Reports marijuana use with last use 2 to 3 days ago.  Was given p.o. Zofran by EMS en route to hospital.  In emergency department CT abdomen and abdominal ultrasound unremarkable.  Saw patient emergency room.  Improved with Zofran.  Current presentation was likely attributed to cannabinoid use.  After initial evaluation patient noted to have fever of 101.4, unclear etiology, review of systems grossly negative. No diarrhea associated with current vomiting.  Hemodynamically stable. No leukocytosis.   -Ordered chest x-ray  -Ordered procalcitonin  -Continue fluid resuscitation as sodium correction allows  -Encourage p.o. intake  -Continue antiemetics  -BMP and electrolytes in AM   -Continue to monitor    #Hyponatremia  Upon admission noted to have sodium 124, Urine sodium 130, urine osmolality 897.   Sodium target for first 24 hours 130.   -Recheck sodium  -Will determine IV fluids based on sodium recheck    #Hypokalemia  #Hypochloremia  Noted to have potassium of 2.9, likely secondary to vomiting. Hypochloremia likely due to vomiting.   -Ordered RN driven potassium replacement protocol    #Acute kidney injury  Baseline  "creatinine around 0.9.  Noted to be at 1.27. Likely secondary to volume depletion.  -Will give IV fluids as sodium correction allows  -Recheck creatinine  -Avoid nephrotoxic medications    #Hyperbilirubinemia  Bilirubin 2.5.  Right upper quadrant ultrasound performed in emergency department, revealing some biliary sludge, no gallbladder thickening, no biliary duct dilation.  Currently has no right upper quadrant pain.  -Continue to monitor    #ADHD  #Depression  -Continue PTA Vyvanse  Continue PTA guanfacine    #Asthma  No acute exacerbation    -PTA as needed albuterol    #Polysubstance use disorder  Patient endorses smoking marijuana.  Uses cannabinoid products every couple of days.     Diet: Regular adult diet  DVT Prophylaxis: Pneumatic Compression Devices  Leong Catheter: Not present  Lines: None     Cardiac Monitoring: None  Code Status: Full    Clinically Significant Risk Factors Present on Admission        # Hypokalemia: Lowest K = 2.9 mmol/L in last 2 days, will replace as needed  # Hyponatremia: Lowest Na = 124 mmol/L in last 2 days, will monitor as appropriate                  # Overweight: Estimated body mass index is 25.83 kg/m  as calculated from the following:    Height as of 8/20/24: 1.778 m (5' 10\").    Weight as of this encounter: 81.6 kg (180 lb).                Disposition Plan     Medically Ready for Discharge:        The patient's care was discussed with the Attending Physician, Dr. Jennifer Hoover   .    Travis High PA-C  Hospitalist Service, [facility]  Securely message with [employer] (more info)  Text page via [facility] Paging/Directory   See signed in provider for up to date coverage information    ______________________________________________________________________    Chief Complaint   Hyperemesis    History of Present Illness   Zoran Gonzáles is a 28 year old male admitted on 9/13/2024.  Medical history notable for polysubstance " abuse, acute kidney injury, seizure, depression, asthma, ADHD, oppositional defiant disorder, fetal alcohol syndrome.  Presented to emergency department for concern of vomiting.  Patient seen in emergency department.  Reports vomiting last 3 days.  Confirms 16 emesis over the past 24 hours.  Denies hematemesis or coffee ground emesis.       Past Medical History    Past Medical History:   Diagnosis Date    ADHD (attention deficit hyperactivity disorder)     Asthma     ODD (oppositional defiant disorder)        Past Surgical History   No past surgical history on file.    Prior to Admission Medications   Prior to Admission Medications   Prescriptions Last Dose Informant Patient Reported? Taking?   Lisdexamfetamine Dimesylate (VYVANSE PO)   Yes No   Sig: Take 60 mg by mouth daily.   albuterol (PROVENTIL HFA: VENTOLIN HFA) 108 (90 BASE) MCG/ACT inhaler   Yes No   Sig: Inhale 2 puffs into the lungs every 6 hours as needed for shortness of breath / dyspnea   guanFACINE HCl (INTUNIV) 4 MG TB24   Yes No   Sig: Take 4 mg by mouth daily (with breakfast).      Facility-Administered Medications: None          Physical Exam   Vital Signs: Temp: 98.7  F (37.1  C)   BP: 121/70 Pulse: 100   Resp: 18 SpO2: 99 % O2 Device: None (Room air)    Weight: 180 lbs 0 oz    Exam:  General: Appears stated age, no acute distress  Head: Normocephalic. No masses, lesions, tenderness or abnormalities  Cardiovascular: S1/S2, Normal rate and rhythm   Respiratory: Normal respiratory effort, lung fields clear to auscultation  Gastrointestinal: Bowel sounds normal, no tenderness to palpation   Musculoskeletal: extremities normal- no gross deformities noted, gait normal, and normal muscle tone  Neuropsych: Speaks clearly, motor/sensory innervation of extremities grossly intact     Medical Decision Making       45 MINUTES SPENT BY ME on the date of service doing chart review, history, exam, documentation & further activities per the note.

## 2024-09-14 NOTE — CARE PLAN
Pt went to X-ray around 0800 and came back ate breakfast and is nowhere to be found on unit since 9 am. Provider  is aware. Writer went outside to check if Pt was on a smoke break or catching fresh air, Pt still not found. Pharmacist called asking writer to talk with Pt to verify medications but Pt was not available and not reachable via phone. Pt did not tell staff or anyone where he was going. Pt discharged after two hours per policy.

## 2024-09-16 LAB
ATRIAL RATE - MUSE: 78 BPM
ATRIAL RATE - MUSE: 84 BPM
DIASTOLIC BLOOD PRESSURE - MUSE: NORMAL MMHG
DIASTOLIC BLOOD PRESSURE - MUSE: NORMAL MMHG
INTERPRETATION ECG - MUSE: NORMAL
INTERPRETATION ECG - MUSE: NORMAL
P AXIS - MUSE: 64 DEGREES
P AXIS - MUSE: 80 DEGREES
PR INTERVAL - MUSE: 150 MS
PR INTERVAL - MUSE: 154 MS
QRS DURATION - MUSE: 90 MS
QRS DURATION - MUSE: 92 MS
QT - MUSE: 440 MS
QT - MUSE: 458 MS
QTC - MUSE: 519 MS
QTC - MUSE: 522 MS
R AXIS - MUSE: 74 DEGREES
R AXIS - MUSE: 87 DEGREES
SYSTOLIC BLOOD PRESSURE - MUSE: NORMAL MMHG
SYSTOLIC BLOOD PRESSURE - MUSE: NORMAL MMHG
T AXIS - MUSE: 63 DEGREES
T AXIS - MUSE: 75 DEGREES
VENTRICULAR RATE- MUSE: 78 BPM
VENTRICULAR RATE- MUSE: 84 BPM